# Patient Record
Sex: MALE | Race: WHITE | Employment: UNEMPLOYED | URBAN - METROPOLITAN AREA
[De-identification: names, ages, dates, MRNs, and addresses within clinical notes are randomized per-mention and may not be internally consistent; named-entity substitution may affect disease eponyms.]

---

## 2018-10-16 ENCOUNTER — HOSPITAL ENCOUNTER (OUTPATIENT)
Age: 48
Discharge: HOME | End: 2018-10-16

## 2018-10-16 LAB
INR BLD: 2.06 INDEX
PROTHROMBIN TIME: 23.7 SECONDS (ref 9.12–12.5)

## 2018-10-16 PROCEDURE — 36415 COLL VENOUS BLD VENIPUNCTURE: CPT

## 2018-10-16 PROCEDURE — 85610 PROTHROMBIN TIME: CPT

## 2019-01-06 ENCOUNTER — APPOINTMENT (OUTPATIENT)
Dept: CT IMAGING | Age: 49
End: 2019-01-06
Payer: COMMERCIAL

## 2019-01-06 ENCOUNTER — APPOINTMENT (OUTPATIENT)
Dept: ULTRASOUND IMAGING | Age: 49
End: 2019-01-06
Payer: COMMERCIAL

## 2019-01-06 ENCOUNTER — HOSPITAL ENCOUNTER (EMERGENCY)
Age: 49
Discharge: HOME HEALTH | End: 2019-01-06
Attending: EMERGENCY MEDICINE
Payer: COMMERCIAL

## 2019-01-06 VITALS
DIASTOLIC BLOOD PRESSURE: 75 MMHG | HEIGHT: 75 IN | OXYGEN SATURATION: 99 % | TEMPERATURE: 98.4 F | BODY MASS INDEX: 39.17 KG/M2 | RESPIRATION RATE: 15 BRPM | SYSTOLIC BLOOD PRESSURE: 112 MMHG | WEIGHT: 315 LBS | HEART RATE: 54 BPM

## 2019-01-06 DIAGNOSIS — N45.3 ORCHITIS AND EPIDIDYMITIS: ICD-10-CM

## 2019-01-06 DIAGNOSIS — N20.0 KIDNEY STONE: Primary | ICD-10-CM

## 2019-01-06 LAB
ALBUMIN SERPL-MCNC: 3.6 GM/DL (ref 3.4–5)
ALP BLD-CCNC: 81 IU/L (ref 40–129)
ALT SERPL-CCNC: 15 U/L (ref 10–40)
ANION GAP SERPL CALCULATED.3IONS-SCNC: 10 MMOL/L (ref 4–16)
AST SERPL-CCNC: 13 IU/L (ref 15–37)
BACTERIA: ABNORMAL /HPF
BASOPHILS ABSOLUTE: 0.1 K/CU MM
BASOPHILS RELATIVE PERCENT: 0.8 % (ref 0–1)
BILIRUB SERPL-MCNC: 0.4 MG/DL (ref 0–1)
BILIRUBIN URINE: NEGATIVE MG/DL
BLOOD, URINE: ABNORMAL
BUN BLDV-MCNC: 27 MG/DL (ref 6–23)
CALCIUM SERPL-MCNC: 8.6 MG/DL (ref 8.3–10.6)
CHLORIDE BLD-SCNC: 105 MMOL/L (ref 99–110)
CLARITY: CLEAR
CO2: 19 MMOL/L (ref 21–32)
COLOR: YELLOW
CREAT SERPL-MCNC: 1.6 MG/DL (ref 0.9–1.3)
DIFFERENTIAL TYPE: ABNORMAL
EOSINOPHILS ABSOLUTE: 0.1 K/CU MM
EOSINOPHILS RELATIVE PERCENT: 0.7 % (ref 0–3)
GFR AFRICAN AMERICAN: 56 ML/MIN/1.73M2
GFR NON-AFRICAN AMERICAN: 46 ML/MIN/1.73M2
GLUCOSE BLD-MCNC: 102 MG/DL (ref 70–99)
GLUCOSE, URINE: NEGATIVE MG/DL
HCT VFR BLD CALC: 57 % (ref 42–52)
HEMOGLOBIN: 16.4 GM/DL (ref 13.5–18)
IMMATURE NEUTROPHIL %: 0.8 % (ref 0–0.43)
KETONES, URINE: NEGATIVE MG/DL
LEUKOCYTE ESTERASE, URINE: NEGATIVE
LIPASE: 51 IU/L (ref 13–60)
LYMPHOCYTES ABSOLUTE: 1.5 K/CU MM
LYMPHOCYTES RELATIVE PERCENT: 16.3 % (ref 24–44)
MCH RBC QN AUTO: 28.7 PG (ref 27–31)
MCHC RBC AUTO-ENTMCNC: 28.8 % (ref 32–36)
MCV RBC AUTO: 99.8 FL (ref 78–100)
MONOCYTES ABSOLUTE: 0.7 K/CU MM
MONOCYTES RELATIVE PERCENT: 7.1 % (ref 0–4)
MUCUS: ABNORMAL HPF
NITRITE URINE, QUANTITATIVE: NEGATIVE
NUCLEATED RBC %: 0 %
PDW BLD-RTO: 14.3 % (ref 11.7–14.9)
PH, URINE: 5 (ref 5–8)
PLATELET # BLD: 241 K/CU MM (ref 140–440)
PMV BLD AUTO: 8.6 FL (ref 7.5–11.1)
POTASSIUM SERPL-SCNC: 5.1 MMOL/L (ref 3.5–5.1)
PROTEIN UA: NEGATIVE MG/DL
RBC # BLD: 5.71 M/CU MM (ref 4.6–6.2)
RBC URINE: 160 /HPF (ref 0–3)
REASON FOR REJECTION: NORMAL
REJECTED TEST: NORMAL
SEGMENTED NEUTROPHILS ABSOLUTE COUNT: 7 K/CU MM
SEGMENTED NEUTROPHILS RELATIVE PERCENT: 74.3 % (ref 36–66)
SODIUM BLD-SCNC: 134 MMOL/L (ref 135–145)
SOURCE: NORMAL
SPECIFIC GRAVITY UA: 1.03 (ref 1–1.03)
SQUAMOUS EPITHELIAL: 1 /HPF
TOTAL IMMATURE NEUTOROPHIL: 0.08 K/CU MM
TOTAL NUCLEATED RBC: 0 K/CU MM
TOTAL PROTEIN: 6.9 GM/DL (ref 6.4–8.2)
TRANSITIONAL EPITHELIAL: <1 /HPF
TRICHOMONAS: ABNORMAL /HPF
UROBILINOGEN, URINE: 1 MG/DL (ref 0.2–1)
WBC # BLD: 9.4 K/CU MM (ref 4–10.5)
WBC UA: 1 /HPF (ref 0–2)

## 2019-01-06 PROCEDURE — 96361 HYDRATE IV INFUSION ADD-ON: CPT

## 2019-01-06 PROCEDURE — 80053 COMPREHEN METABOLIC PANEL: CPT

## 2019-01-06 PROCEDURE — 96365 THER/PROPH/DIAG IV INF INIT: CPT

## 2019-01-06 PROCEDURE — 6360000002 HC RX W HCPCS: Performed by: EMERGENCY MEDICINE

## 2019-01-06 PROCEDURE — 83690 ASSAY OF LIPASE: CPT

## 2019-01-06 PROCEDURE — 87086 URINE CULTURE/COLONY COUNT: CPT

## 2019-01-06 PROCEDURE — 6360000002 HC RX W HCPCS: Performed by: PHYSICIAN ASSISTANT

## 2019-01-06 PROCEDURE — 93975 VASCULAR STUDY: CPT

## 2019-01-06 PROCEDURE — 76870 US EXAM SCROTUM: CPT

## 2019-01-06 PROCEDURE — 2580000003 HC RX 258: Performed by: EMERGENCY MEDICINE

## 2019-01-06 PROCEDURE — 96376 TX/PRO/DX INJ SAME DRUG ADON: CPT

## 2019-01-06 PROCEDURE — 96375 TX/PRO/DX INJ NEW DRUG ADDON: CPT

## 2019-01-06 PROCEDURE — 6370000000 HC RX 637 (ALT 250 FOR IP): Performed by: EMERGENCY MEDICINE

## 2019-01-06 PROCEDURE — 81001 URINALYSIS AUTO W/SCOPE: CPT

## 2019-01-06 PROCEDURE — 85025 COMPLETE CBC W/AUTO DIFF WBC: CPT

## 2019-01-06 PROCEDURE — 74176 CT ABD & PELVIS W/O CONTRAST: CPT

## 2019-01-06 PROCEDURE — 99284 EMERGENCY DEPT VISIT MOD MDM: CPT

## 2019-01-06 RX ORDER — KETOROLAC TROMETHAMINE 30 MG/ML
15 INJECTION, SOLUTION INTRAMUSCULAR; INTRAVENOUS ONCE
Status: COMPLETED | OUTPATIENT
Start: 2019-01-06 | End: 2019-01-06

## 2019-01-06 RX ORDER — 0.9 % SODIUM CHLORIDE 0.9 %
1000 INTRAVENOUS SOLUTION INTRAVENOUS ONCE
Status: COMPLETED | OUTPATIENT
Start: 2019-01-06 | End: 2019-01-06

## 2019-01-06 RX ORDER — MORPHINE SULFATE 4 MG/ML
4 INJECTION, SOLUTION INTRAMUSCULAR; INTRAVENOUS EVERY 30 MIN PRN
Status: DISCONTINUED | OUTPATIENT
Start: 2019-01-06 | End: 2019-01-06 | Stop reason: HOSPADM

## 2019-01-06 RX ORDER — TAMSULOSIN HYDROCHLORIDE 0.4 MG/1
0.4 CAPSULE ORAL ONCE
Status: COMPLETED | OUTPATIENT
Start: 2019-01-06 | End: 2019-01-06

## 2019-01-06 RX ORDER — ONDANSETRON 4 MG/1
4 TABLET, ORALLY DISINTEGRATING ORAL EVERY 8 HOURS PRN
Qty: 15 TABLET | Refills: 0 | Status: SHIPPED | OUTPATIENT
Start: 2019-01-06 | End: 2019-06-10

## 2019-01-06 RX ORDER — OXYCODONE HYDROCHLORIDE AND ACETAMINOPHEN 5; 325 MG/1; MG/1
1 TABLET ORAL EVERY 4 HOURS PRN
Qty: 15 TABLET | Refills: 0 | Status: SHIPPED | OUTPATIENT
Start: 2019-01-06 | End: 2019-01-09

## 2019-01-06 RX ORDER — TAMSULOSIN HYDROCHLORIDE 0.4 MG/1
0.4 CAPSULE ORAL DAILY
Qty: 10 CAPSULE | Refills: 3 | Status: SHIPPED | OUTPATIENT
Start: 2019-01-06 | End: 2019-06-10 | Stop reason: ALTCHOICE

## 2019-01-06 RX ORDER — LEVOFLOXACIN 500 MG/1
500 TABLET, FILM COATED ORAL DAILY
Qty: 10 TABLET | Refills: 0 | Status: SHIPPED | OUTPATIENT
Start: 2019-01-06 | End: 2019-01-16

## 2019-01-06 RX ORDER — LEVOFLOXACIN 500 MG/1
500 TABLET, FILM COATED ORAL ONCE
Status: COMPLETED | OUTPATIENT
Start: 2019-01-06 | End: 2019-01-06

## 2019-01-06 RX ORDER — ONDANSETRON 2 MG/ML
4 INJECTION INTRAMUSCULAR; INTRAVENOUS EVERY 30 MIN PRN
Status: DISCONTINUED | OUTPATIENT
Start: 2019-01-06 | End: 2019-01-06 | Stop reason: HOSPADM

## 2019-01-06 RX ADMIN — KETOROLAC TROMETHAMINE 15 MG: 30 INJECTION, SOLUTION INTRAMUSCULAR; INTRAVENOUS at 07:39

## 2019-01-06 RX ADMIN — MORPHINE SULFATE 4 MG: 4 INJECTION INTRAVENOUS at 09:09

## 2019-01-06 RX ADMIN — MORPHINE SULFATE 4 MG: 4 INJECTION INTRAVENOUS at 11:05

## 2019-01-06 RX ADMIN — ONDANSETRON 4 MG: 2 INJECTION INTRAMUSCULAR; INTRAVENOUS at 07:40

## 2019-01-06 RX ADMIN — SODIUM CHLORIDE 1000 ML: 9 INJECTION, SOLUTION INTRAVENOUS at 10:53

## 2019-01-06 RX ADMIN — LEVOFLOXACIN 500 MG: 500 TABLET, FILM COATED ORAL at 10:53

## 2019-01-06 RX ADMIN — MORPHINE SULFATE 4 MG: 4 INJECTION INTRAVENOUS at 07:39

## 2019-01-06 RX ADMIN — TAMSULOSIN HYDROCHLORIDE 0.4 MG: 0.4 CAPSULE ORAL at 10:53

## 2019-01-06 RX ADMIN — LIDOCAINE HYDROCHLORIDE 150 MG: 20 INJECTION, SOLUTION INTRAVENOUS at 10:26

## 2019-01-06 ASSESSMENT — ENCOUNTER SYMPTOMS
CONSTIPATION: 0
BACK PAIN: 0
SORE THROAT: 0
ABDOMINAL PAIN: 1
COUGH: 0
EYE REDNESS: 0
VOMITING: 0
SHORTNESS OF BREATH: 0
DIARRHEA: 0
RHINORRHEA: 0
NAUSEA: 1

## 2019-01-06 ASSESSMENT — PAIN SCALES - GENERAL
PAINLEVEL_OUTOF10: 4
PAINLEVEL_OUTOF10: 10
PAINLEVEL_OUTOF10: 5
PAINLEVEL_OUTOF10: 5
PAINLEVEL_OUTOF10: 10

## 2019-01-06 ASSESSMENT — PAIN DESCRIPTION - DESCRIPTORS
DESCRIPTORS: STABBING;ACHING
DESCRIPTORS: CONSTANT;SHARP;SHOOTING;STABBING

## 2019-01-06 ASSESSMENT — PAIN DESCRIPTION - PAIN TYPE
TYPE: ACUTE PAIN
TYPE: ACUTE PAIN

## 2019-01-06 ASSESSMENT — PAIN DESCRIPTION - ORIENTATION
ORIENTATION: LEFT
ORIENTATION: LEFT

## 2019-01-06 ASSESSMENT — PAIN DESCRIPTION - FREQUENCY
FREQUENCY: CONTINUOUS
FREQUENCY: CONTINUOUS

## 2019-01-06 ASSESSMENT — PAIN DESCRIPTION - LOCATION
LOCATION: FLANK
LOCATION: FLANK

## 2019-01-07 LAB
CULTURE: NORMAL
Lab: NORMAL
REPORT STATUS: NORMAL
SPECIMEN: NORMAL

## 2019-01-10 ENCOUNTER — HOSPITAL ENCOUNTER (OUTPATIENT)
Age: 49
Discharge: HOME | End: 2019-01-10
Payer: COMMERCIAL

## 2019-01-10 LAB
INR BLD: 1.92 INDEX
PROTHROMBIN TIME: 21.8 SECONDS (ref 9.12–12.5)

## 2019-01-10 PROCEDURE — 85610 PROTHROMBIN TIME: CPT

## 2019-01-10 PROCEDURE — 36415 COLL VENOUS BLD VENIPUNCTURE: CPT

## 2019-01-17 ENCOUNTER — HOSPITAL ENCOUNTER (OUTPATIENT)
Age: 49
Discharge: HOME | End: 2019-01-17
Payer: COMMERCIAL

## 2019-01-17 LAB
INR BLD: 2.11 INDEX
PROTHROMBIN TIME: 24.3 SECONDS (ref 9.12–12.5)

## 2019-01-17 PROCEDURE — 85610 PROTHROMBIN TIME: CPT

## 2019-01-17 PROCEDURE — 36415 COLL VENOUS BLD VENIPUNCTURE: CPT

## 2019-03-23 ENCOUNTER — HOSPITAL ENCOUNTER (OUTPATIENT)
Age: 49
Discharge: HOME | End: 2019-03-23
Payer: COMMERCIAL

## 2019-03-23 LAB
INR BLD: 2.39 INDEX
PROTHROMBIN TIME: 27.5 SECONDS (ref 9.12–12.5)

## 2019-03-23 PROCEDURE — 85610 PROTHROMBIN TIME: CPT

## 2019-03-23 PROCEDURE — 36415 COLL VENOUS BLD VENIPUNCTURE: CPT

## 2019-04-04 ENCOUNTER — TELEPHONE (OUTPATIENT)
Dept: GASTROENTEROLOGY | Age: 49
End: 2019-04-04

## 2019-04-04 NOTE — TELEPHONE ENCOUNTER
Please ask him to contact his PCP and get a referral first and then let me know.     Thanks    [employer]

## 2019-04-04 NOTE — TELEPHONE ENCOUNTER
Dr. Jaymie Davis, please see telephone message & advise if patient should have referral & if patient can see Effie Dance or wait until July/August for Dr. Jaymie Davis?

## 2020-08-26 ENCOUNTER — APPOINTMENT (OUTPATIENT)
Dept: GENERAL RADIOLOGY | Age: 50
DRG: 247 | End: 2020-08-26

## 2020-08-26 ENCOUNTER — HOSPITAL ENCOUNTER (INPATIENT)
Age: 50
LOS: 2 days | Discharge: HOME | DRG: 247 | End: 2020-08-28
Attending: EMERGENCY MEDICINE | Admitting: INTERNAL MEDICINE

## 2020-08-26 LAB
ALBUMIN SERPL-MCNC: 4 GM/DL (ref 3.4–5)
ALP BLD-CCNC: 85 IU/L (ref 40–129)
ALT SERPL-CCNC: 21 U/L (ref 10–40)
ANION GAP SERPL CALCULATED.3IONS-SCNC: 8 MMOL/L (ref 4–16)
APTT: 28.9 SECONDS (ref 25.1–37.1)
AST SERPL-CCNC: 15 IU/L (ref 15–37)
BASOPHILS ABSOLUTE: 0.1 K/CU MM
BASOPHILS RELATIVE PERCENT: 0.5 % (ref 0–1)
BILIRUB SERPL-MCNC: 0.6 MG/DL (ref 0–1)
BUN BLDV-MCNC: 23 MG/DL (ref 6–23)
CALCIUM SERPL-MCNC: 9.4 MG/DL (ref 8.3–10.6)
CHLORIDE BLD-SCNC: 105 MMOL/L (ref 99–110)
CO2: 25 MMOL/L (ref 21–32)
CREAT SERPL-MCNC: 1.3 MG/DL (ref 0.9–1.3)
DIFFERENTIAL TYPE: ABNORMAL
EOSINOPHILS ABSOLUTE: 0.2 K/CU MM
EOSINOPHILS RELATIVE PERCENT: 1.9 % (ref 0–3)
GFR AFRICAN AMERICAN: >60 ML/MIN/1.73M2
GFR NON-AFRICAN AMERICAN: 58 ML/MIN/1.73M2
GLUCOSE BLD-MCNC: 101 MG/DL (ref 70–99)
HCT VFR BLD CALC: 56.1 % (ref 42–52)
HEMOGLOBIN: 17.8 GM/DL (ref 13.5–18)
IMMATURE NEUTROPHIL %: 0.3 % (ref 0–0.43)
INR BLD: 1.02 INDEX
LYMPHOCYTES ABSOLUTE: 2.5 K/CU MM
LYMPHOCYTES RELATIVE PERCENT: 26.2 % (ref 24–44)
MCH RBC QN AUTO: 29.7 PG (ref 27–31)
MCHC RBC AUTO-ENTMCNC: 31.7 % (ref 32–36)
MCV RBC AUTO: 93.5 FL (ref 78–100)
MONOCYTES ABSOLUTE: 0.6 K/CU MM
MONOCYTES RELATIVE PERCENT: 6.3 % (ref 0–4)
NUCLEATED RBC %: 0 %
PDW BLD-RTO: 13.2 % (ref 11.7–14.9)
PLATELET # BLD: 252 K/CU MM (ref 140–440)
PMV BLD AUTO: 8.4 FL (ref 7.5–11.1)
POTASSIUM SERPL-SCNC: 5 MMOL/L (ref 3.5–5.1)
PROTHROMBIN TIME: 12.3 SECONDS (ref 11.7–14.5)
RBC # BLD: 6 M/CU MM (ref 4.6–6.2)
SEGMENTED NEUTROPHILS ABSOLUTE COUNT: 6.1 K/CU MM
SEGMENTED NEUTROPHILS RELATIVE PERCENT: 64.8 % (ref 36–66)
SODIUM BLD-SCNC: 138 MMOL/L (ref 135–145)
TOTAL IMMATURE NEUTOROPHIL: 0.03 K/CU MM
TOTAL NUCLEATED RBC: 0 K/CU MM
TOTAL PROTEIN: 6.8 GM/DL (ref 6.4–8.2)
TOTAL RETICULOCYTE COUNT: 0.09 K/CU MM
TROPONIN T: <0.01 NG/ML
WBC # BLD: 9.5 K/CU MM (ref 4–10.5)

## 2020-08-26 PROCEDURE — 6370000000 HC RX 637 (ALT 250 FOR IP): Performed by: EMERGENCY MEDICINE

## 2020-08-26 PROCEDURE — 84484 ASSAY OF TROPONIN QUANT: CPT

## 2020-08-26 PROCEDURE — 71045 X-RAY EXAM CHEST 1 VIEW: CPT

## 2020-08-26 PROCEDURE — 85025 COMPLETE CBC W/AUTO DIFF WBC: CPT

## 2020-08-26 PROCEDURE — 80048 BASIC METABOLIC PNL TOTAL CA: CPT

## 2020-08-26 PROCEDURE — 1200000000 HC SEMI PRIVATE

## 2020-08-26 PROCEDURE — 85610 PROTHROMBIN TIME: CPT

## 2020-08-26 PROCEDURE — 36415 COLL VENOUS BLD VENIPUNCTURE: CPT

## 2020-08-26 PROCEDURE — 85730 THROMBOPLASTIN TIME PARTIAL: CPT

## 2020-08-26 PROCEDURE — 2580000003 HC RX 258: Performed by: INTERNAL MEDICINE

## 2020-08-26 PROCEDURE — 80053 COMPREHEN METABOLIC PANEL: CPT

## 2020-08-26 PROCEDURE — 93005 ELECTROCARDIOGRAM TRACING: CPT | Performed by: EMERGENCY MEDICINE

## 2020-08-26 PROCEDURE — 99285 EMERGENCY DEPT VISIT HI MDM: CPT

## 2020-08-26 PROCEDURE — 6360000002 HC RX W HCPCS: Performed by: EMERGENCY MEDICINE

## 2020-08-26 RX ORDER — MORPHINE SULFATE 4 MG/ML
4 INJECTION, SOLUTION INTRAMUSCULAR; INTRAVENOUS ONCE
Status: COMPLETED | OUTPATIENT
Start: 2020-08-26 | End: 2020-08-26

## 2020-08-26 RX ORDER — ONDANSETRON 2 MG/ML
4 INJECTION INTRAMUSCULAR; INTRAVENOUS ONCE
Status: COMPLETED | OUTPATIENT
Start: 2020-08-26 | End: 2020-08-26

## 2020-08-26 RX ORDER — ASPIRIN 81 MG/1
324 TABLET, CHEWABLE ORAL ONCE
Status: COMPLETED | OUTPATIENT
Start: 2020-08-26 | End: 2020-08-26

## 2020-08-26 RX ORDER — SODIUM CHLORIDE, SODIUM LACTATE, POTASSIUM CHLORIDE, CALCIUM CHLORIDE 600; 310; 30; 20 MG/100ML; MG/100ML; MG/100ML; MG/100ML
INJECTION, SOLUTION INTRAVENOUS CONTINUOUS
Status: DISCONTINUED | OUTPATIENT
Start: 2020-08-26 | End: 2020-08-27

## 2020-08-26 RX ADMIN — SODIUM CHLORIDE, POTASSIUM CHLORIDE, SODIUM LACTATE AND CALCIUM CHLORIDE: 600; 310; 30; 20 INJECTION, SOLUTION INTRAVENOUS at 22:22

## 2020-08-26 RX ADMIN — ONDANSETRON 4 MG: 2 INJECTION INTRAMUSCULAR; INTRAVENOUS at 21:59

## 2020-08-26 RX ADMIN — MORPHINE SULFATE 4 MG: 4 INJECTION, SOLUTION INTRAMUSCULAR; INTRAVENOUS at 21:58

## 2020-08-26 RX ADMIN — ASPIRIN 81 MG CHEWABLE TABLET 324 MG: 81 TABLET CHEWABLE at 21:33

## 2020-08-26 ASSESSMENT — PAIN SCALES - GENERAL
PAINLEVEL_OUTOF10: 3
PAINLEVEL_OUTOF10: 3

## 2020-08-26 ASSESSMENT — PAIN DESCRIPTION - DESCRIPTORS: DESCRIPTORS: PRESSURE

## 2020-08-26 ASSESSMENT — PAIN DESCRIPTION - ORIENTATION: ORIENTATION: MID

## 2020-08-26 ASSESSMENT — PAIN DESCRIPTION - LOCATION: LOCATION: CHEST

## 2020-08-26 ASSESSMENT — PAIN DESCRIPTION - PAIN TYPE: TYPE: ACUTE PAIN

## 2020-08-26 NOTE — ED PROVIDER NOTES
As physician-in-triage, I performed a medical screening history and physical exam on this patient. HISTORY OF PRESENT ILLNESS  Opal Salazar is a 44 y.o. male chest pressure 1-2 weeks. Denies additional symptoms. Had negative stress test in July. Previous mi history. PHYSICAL EXAM  /71   Pulse 65   Temp 98.5 °F (36.9 °C) (Oral)   Resp 18   Ht 6' 3\" (1.905 m)   Wt (!) 304 lb (137.9 kg)   SpO2 96%   BMI 38.00 kg/m²     On exam, the patient appears in no acute distress. Speech is clear. Breathing is unlabored. Moves all extremities    Comment: Please note this report has been produced using speech recognition software and may contain errors related to that system including errors in grammar, punctuation, and spelling, as well as words and phrases that may be inappropriate. If there are any questions or concerns please feel free to contact the dictating provider for clarification.      Willa German DO  08/26/20 [id number]

## 2020-08-27 ENCOUNTER — APPOINTMENT (OUTPATIENT)
Dept: CT IMAGING | Age: 50
DRG: 247 | End: 2020-08-27

## 2020-08-27 LAB
ACTIVATED CLOTTING TIME, LOW RANGE: 290 SEC
ACTIVATED CLOTTING TIME, LOW RANGE: >400 SEC
EKG ATRIAL RATE: 67 BPM
EKG DIAGNOSIS: NORMAL
EKG P AXIS: 51 DEGREES
EKG P-R INTERVAL: 160 MS
EKG Q-T INTERVAL: 396 MS
EKG QRS DURATION: 96 MS
EKG QTC CALCULATION (BAZETT): 418 MS
EKG R AXIS: -35 DEGREES
EKG T AXIS: 34 DEGREES
EKG VENTRICULAR RATE: 67 BPM
HCT VFR BLD CALC: 52.7 % (ref 42–52)
HEMOGLOBIN: 16.3 GM/DL (ref 13.5–18)
LV EF: 55 %
LVEF MODALITY: NORMAL
MCH RBC QN AUTO: 29.6 PG (ref 27–31)
MCHC RBC AUTO-ENTMCNC: 30.9 % (ref 32–36)
MCV RBC AUTO: 95.6 FL (ref 78–100)
PDW BLD-RTO: 13.2 % (ref 11.7–14.9)
PLATELET # BLD: 214 K/CU MM (ref 140–440)
PMV BLD AUTO: 8.4 FL (ref 7.5–11.1)
RBC # BLD: 5.51 M/CU MM (ref 4.6–6.2)
TROPONIN T: <0.01 NG/ML
TROPONIN T: <0.01 NG/ML
WBC # BLD: 8.8 K/CU MM (ref 4–10.5)

## 2020-08-27 PROCEDURE — 6360000004 HC RX CONTRAST MEDICATION

## 2020-08-27 PROCEDURE — C1769 GUIDE WIRE: HCPCS

## 2020-08-27 PROCEDURE — 36415 COLL VENOUS BLD VENIPUNCTURE: CPT

## 2020-08-27 PROCEDURE — 6370000000 HC RX 637 (ALT 250 FOR IP): Performed by: INTERNAL MEDICINE

## 2020-08-27 PROCEDURE — 2140000000 HC CCU INTERMEDIATE R&B

## 2020-08-27 PROCEDURE — B2111ZZ FLUOROSCOPY OF MULTIPLE CORONARY ARTERIES USING LOW OSMOLAR CONTRAST: ICD-10-PCS | Performed by: INTERNAL MEDICINE

## 2020-08-27 PROCEDURE — 2709999900 HC NON-CHARGEABLE SUPPLY

## 2020-08-27 PROCEDURE — 6360000002 HC RX W HCPCS

## 2020-08-27 PROCEDURE — 92928 PRQ TCAT PLMT NTRAC ST 1 LES: CPT

## 2020-08-27 PROCEDURE — C1874 STENT, COATED/COV W/DEL SYS: HCPCS

## 2020-08-27 PROCEDURE — 85347 COAGULATION TIME ACTIVATED: CPT

## 2020-08-27 PROCEDURE — 71275 CT ANGIOGRAPHY CHEST: CPT

## 2020-08-27 PROCEDURE — 027135Z DILATION OF CORONARY ARTERY, TWO ARTERIES WITH TWO DRUG-ELUTING INTRALUMINAL DEVICES, PERCUTANEOUS APPROACH: ICD-10-PCS | Performed by: INTERNAL MEDICINE

## 2020-08-27 PROCEDURE — 93010 ELECTROCARDIOGRAM REPORT: CPT | Performed by: INTERNAL MEDICINE

## 2020-08-27 PROCEDURE — 2500000003 HC RX 250 WO HCPCS

## 2020-08-27 PROCEDURE — 2580000003 HC RX 258: Performed by: INTERNAL MEDICINE

## 2020-08-27 PROCEDURE — 93306 TTE W/DOPPLER COMPLETE: CPT

## 2020-08-27 PROCEDURE — 4A023N7 MEASUREMENT OF CARDIAC SAMPLING AND PRESSURE, LEFT HEART, PERCUTANEOUS APPROACH: ICD-10-PCS | Performed by: INTERNAL MEDICINE

## 2020-08-27 PROCEDURE — 85027 COMPLETE CBC AUTOMATED: CPT

## 2020-08-27 PROCEDURE — 84484 ASSAY OF TROPONIN QUANT: CPT

## 2020-08-27 PROCEDURE — 6360000004 HC RX CONTRAST MEDICATION: Performed by: INTERNAL MEDICINE

## 2020-08-27 PROCEDURE — 6370000000 HC RX 637 (ALT 250 FOR IP)

## 2020-08-27 PROCEDURE — C1887 CATHETER, GUIDING: HCPCS

## 2020-08-27 PROCEDURE — 93458 L HRT ARTERY/VENTRICLE ANGIO: CPT

## 2020-08-27 PROCEDURE — 94761 N-INVAS EAR/PLS OXIMETRY MLT: CPT

## 2020-08-27 PROCEDURE — C1894 INTRO/SHEATH, NON-LASER: HCPCS

## 2020-08-27 RX ORDER — LOSARTAN POTASSIUM 100 MG/1
100 TABLET ORAL DAILY
Status: DISCONTINUED | OUTPATIENT
Start: 2020-08-27 | End: 2020-08-28 | Stop reason: HOSPADM

## 2020-08-27 RX ORDER — ASPIRIN 81 MG/1
81 TABLET, CHEWABLE ORAL DAILY
Status: DISCONTINUED | OUTPATIENT
Start: 2020-08-28 | End: 2020-08-28 | Stop reason: HOSPADM

## 2020-08-27 RX ORDER — SODIUM CHLORIDE 9 MG/ML
INJECTION, SOLUTION INTRAVENOUS CONTINUOUS
Status: DISCONTINUED | OUTPATIENT
Start: 2020-08-27 | End: 2020-08-28

## 2020-08-27 RX ORDER — ACETAMINOPHEN 325 MG/1
650 TABLET ORAL EVERY 4 HOURS PRN
Status: DISCONTINUED | OUTPATIENT
Start: 2020-08-27 | End: 2020-08-28 | Stop reason: HOSPADM

## 2020-08-27 RX ORDER — METOPROLOL SUCCINATE 25 MG/1
25 TABLET, EXTENDED RELEASE ORAL DAILY
Status: DISCONTINUED | OUTPATIENT
Start: 2020-08-27 | End: 2020-08-28 | Stop reason: HOSPADM

## 2020-08-27 RX ORDER — SODIUM CHLORIDE 0.9 % (FLUSH) 0.9 %
10 SYRINGE (ML) INJECTION EVERY 12 HOURS SCHEDULED
Status: DISCONTINUED | OUTPATIENT
Start: 2020-08-27 | End: 2020-08-28 | Stop reason: HOSPADM

## 2020-08-27 RX ORDER — ASPIRIN 325 MG
325 TABLET ORAL DAILY
Status: DISCONTINUED | OUTPATIENT
Start: 2020-08-27 | End: 2020-08-27

## 2020-08-27 RX ORDER — SODIUM CHLORIDE 9 MG/ML
INJECTION, SOLUTION INTRAVENOUS CONTINUOUS
Status: DISCONTINUED | OUTPATIENT
Start: 2020-08-27 | End: 2020-08-27 | Stop reason: SDUPTHER

## 2020-08-27 RX ORDER — OXYCODONE HYDROCHLORIDE AND ACETAMINOPHEN 5; 325 MG/1; MG/1
1 TABLET ORAL EVERY 4 HOURS PRN
Status: DISCONTINUED | OUTPATIENT
Start: 2020-08-27 | End: 2020-08-28 | Stop reason: HOSPADM

## 2020-08-27 RX ORDER — ACETAMINOPHEN 325 MG/1
650 TABLET ORAL EVERY 6 HOURS PRN
Status: DISCONTINUED | OUTPATIENT
Start: 2020-08-27 | End: 2020-08-27 | Stop reason: SDUPTHER

## 2020-08-27 RX ORDER — MORPHINE SULFATE 2 MG/ML
1 INJECTION, SOLUTION INTRAMUSCULAR; INTRAVENOUS
Status: ACTIVE | OUTPATIENT
Start: 2020-08-27 | End: 2020-08-27

## 2020-08-27 RX ORDER — ONDANSETRON 2 MG/ML
4 INJECTION INTRAMUSCULAR; INTRAVENOUS EVERY 6 HOURS PRN
Status: DISCONTINUED | OUTPATIENT
Start: 2020-08-27 | End: 2020-08-28 | Stop reason: HOSPADM

## 2020-08-27 RX ORDER — POLYETHYLENE GLYCOL 3350 17 G/17G
17 POWDER, FOR SOLUTION ORAL DAILY PRN
Status: DISCONTINUED | OUTPATIENT
Start: 2020-08-27 | End: 2020-08-28 | Stop reason: HOSPADM

## 2020-08-27 RX ORDER — CLOPIDOGREL BISULFATE 75 MG/1
75 TABLET ORAL DAILY
Status: DISCONTINUED | OUTPATIENT
Start: 2020-08-28 | End: 2020-08-28 | Stop reason: HOSPADM

## 2020-08-27 RX ORDER — SODIUM CHLORIDE 0.9 % (FLUSH) 0.9 %
10 SYRINGE (ML) INJECTION PRN
Status: DISCONTINUED | OUTPATIENT
Start: 2020-08-27 | End: 2020-08-28 | Stop reason: HOSPADM

## 2020-08-27 RX ORDER — ATORVASTATIN CALCIUM 40 MG/1
40 TABLET, FILM COATED ORAL NIGHTLY
Status: DISCONTINUED | OUTPATIENT
Start: 2020-08-27 | End: 2020-08-28 | Stop reason: HOSPADM

## 2020-08-27 RX ORDER — ATROPINE SULFATE 0.4 MG/ML
0.5 AMPUL (ML) INJECTION
Status: ACTIVE | OUTPATIENT
Start: 2020-08-27 | End: 2020-08-27

## 2020-08-27 RX ORDER — ACETAMINOPHEN 650 MG/1
650 SUPPOSITORY RECTAL EVERY 6 HOURS PRN
Status: DISCONTINUED | OUTPATIENT
Start: 2020-08-27 | End: 2020-08-28 | Stop reason: HOSPADM

## 2020-08-27 RX ORDER — NICOTINE 21 MG/24HR
1 PATCH, TRANSDERMAL 24 HOURS TRANSDERMAL DAILY
Status: DISCONTINUED | OUTPATIENT
Start: 2020-08-27 | End: 2020-08-28 | Stop reason: HOSPADM

## 2020-08-27 RX ORDER — PROMETHAZINE HYDROCHLORIDE 25 MG/1
12.5 TABLET ORAL EVERY 6 HOURS PRN
Status: DISCONTINUED | OUTPATIENT
Start: 2020-08-27 | End: 2020-08-28 | Stop reason: HOSPADM

## 2020-08-27 RX ORDER — METOPROLOL SUCCINATE 50 MG/1
50 TABLET, EXTENDED RELEASE ORAL DAILY
Status: DISCONTINUED | OUTPATIENT
Start: 2020-08-27 | End: 2020-08-27

## 2020-08-27 RX ADMIN — ATORVASTATIN CALCIUM 40 MG: 40 TABLET, FILM COATED ORAL at 21:39

## 2020-08-27 RX ADMIN — SODIUM CHLORIDE, PRESERVATIVE FREE 10 ML: 5 INJECTION INTRAVENOUS at 09:59

## 2020-08-27 RX ADMIN — IOPAMIDOL 80 ML: 755 INJECTION, SOLUTION INTRAVENOUS at 09:58

## 2020-08-27 RX ADMIN — OXYCODONE HYDROCHLORIDE AND ACETAMINOPHEN 1 TABLET: 5; 325 TABLET ORAL at 12:38

## 2020-08-27 ASSESSMENT — PAIN SCALES - GENERAL
PAINLEVEL_OUTOF10: 0
PAINLEVEL_OUTOF10: 0
PAINLEVEL_OUTOF10: 5
PAINLEVEL_OUTOF10: 0

## 2020-08-27 ASSESSMENT — PAIN - FUNCTIONAL ASSESSMENT: PAIN_FUNCTIONAL_ASSESSMENT: 0-10

## 2020-08-27 NOTE — PROGRESS NOTES
Patient follows in office  He texted saying that he Is waiting in ER for 2.5 hours  Spoke with Estee-patient will be seen soon by er   Trop negative    Cath 2013-negative  Last stress test 5/2020- in office--EF 58%, large apical wall infarction , no ischemia   Will consider cath due to recent stress test

## 2020-08-27 NOTE — PLAN OF CARE
Problem: Falls - Risk of:  Goal: Will remain free from falls  Description: Will remain free from falls  8/27/2020 1105 by Jamaal Myrick RN  Outcome: Ongoing  8/27/2020 1104 by Jamaal Myrick RN  Outcome: Ongoing  Goal: Absence of physical injury  Description: Absence of physical injury  8/27/2020 1105 by Jamaal Myrick RN  Outcome: Ongoing  8/27/2020 1104 by Jamaal Myrick RN  Outcome: Ongoing     Problem: Infection:  Goal: Will remain free from infection  Description: Will remain free from infection  Outcome: Ongoing     Problem: Safety:  Goal: Free from accidental physical injury  Description: Free from accidental physical injury  Outcome: Ongoing  Goal: Free from intentional harm  Description: Free from intentional harm  Outcome: Ongoing     Problem: Daily Care:  Goal: Daily care needs are met  Description: Daily care needs are met  Outcome: Ongoing     Problem: Pain:  Goal: Patient's pain/discomfort is manageable  Description: Patient's pain/discomfort is manageable  Outcome: Ongoing     Problem: Skin Integrity:  Goal: Skin integrity will stabilize  Description: Skin integrity will stabilize  Outcome: Ongoing     Problem: Discharge Planning:  Goal: Patients continuum of care needs are met  Description: Patients continuum of care needs are met  Outcome: Ongoing

## 2020-08-27 NOTE — H&P
HISTORY AND PHYSICAL  (Hospitalist, Internal Medicine)  IDENTIFYING INFORMATION   PATIENT:  Collette Alarcon  MRN:  [id number]  ADMIT DATE: 8/26/2020  TIME OF EVALUATION: 8/26/2020 9:28 PM    CHIEF COMPLAINT     CP  HISTORY OF PRESENT ILLNESS   Collette Alarcon is a 44 y.o. male admitted for CP for about two weeks, \"maybe a little bit longer. \" Started a new truck drivering job, thought he was not adjusting to a new shift and the associated stress, but because it became persistent he came in to the ED. Had a stress test that was normal in July 2020. Also he \"had a thrombus in the heart, [however] was told to quit coumadin. \" He quit on Friday the 14th of August of this year. He states the CP is couple min to 10-15 min usually, but today the CP started at 4 pm, he was going to start work and it started. He didn't come sooner because he thought it was d/t stress, but felt like he had enough and came to the ED. Pt otherwise has no complaints of SOB, dizziness, N/V/C/D, abdominal pain, dysuria, joint pains, rash/boils, or fevers.        PMH listed below:    PAST MEDICAL, SURGICAL, FAMILY, and SOCIAL HISTORY     Past Medical History:   Diagnosis Date    CAD (coronary artery disease)     Chronic kidney disease     stones 20 yrs ago    [facility] [address]     Hyperlipidemia     Myocardial infarction ([facility] [address] 1995, 1996, 2009    MI x3     Past Surgical History:   Procedure Laterality Date    APPENDECTOMY      CARDIAC SURGERY      heart caths    CHOLECYSTECTOMY      CORONARY ANGIOPLASTY WITH STENT PLACEMENT      CYST INCISION AND DRAINAGE       Family History   Problem Relation Age of Onset    Heart Disease Mother     Arthritis Mother     High Blood Pressure Mother     Heart Disease Father     Diabetes Brother     High Blood Pressure Brother     Mental Illness Brother      Family Hx of HTN  Family Hx as reviewed above, otherwise non-contributory  Social History     Socioeconomic History  Marital status:      Spouse name: None    Number of children: None    Years of education: None    Highest education level: None   Occupational History    None   Social Needs    Financial resource strain: None    Food insecurity     Worry: None     Inability: None    Transportation needs     Medical: None     Non-medical: None   Tobacco Use    Smoking status: Current Every Day Smoker     Packs/day: 1.00     Years: 15.00     Pack years: 15.00     Types: Cigarettes    Smokeless tobacco: Former User   Substance and Sexual Activity    Alcohol use: No    Drug use: No    Sexual activity: Yes   Lifestyle    Physical activity     Days per week: None     Minutes per session: None    Stress: None   Relationships    Social connections     Talks on phone: None     Gets together: None     Attends [employer] service: None     Active member of club or organization: None     Attends meetings of clubs or organizations: None     Relationship status: None    Intimate partner violence     Fear of current or ex partner: None     Emotionally abused: None     Physically abused: None     Forced sexual activity: None   Other Topics Concern    None   Social History Narrative    None       MEDICATIONS   Medications Prior to Admission  Not in a hospital admission. Current Medications  Current Facility-Administered Medications   Medication Dose Route Frequency Provider Last Rate Last Dose    aspirin chewable tablet 324 mg  324 mg Oral Once Dee Dee Krishna MD         Current Outpatient Medications   Medication Sig Dispense Refill    losartan (COZAAR) 100 MG tablet       metoprolol succinate (TOPROL XL) 50 MG extended release tablet daily.  nitroGLYCERIN (NITROSTAT) 0.4 MG SL tablet Place 0.4 mg under the tongue      isosorbide mononitrate (IMDUR) 30 MG CR tablet Take 30 mg by mouth daily.  aspirin 325 MG tablet Take 325 mg by mouth daily.            Allergies  No Known Allergies    REVIEW OF SYSTEMS Within above limitations. 14 point review of systems reviewed. Pertinent positive or negative as per HPI or otherwise negative per 14 point systems review. PHYSICAL EXAM     Wt Readings from Last 3 Encounters:   08/26/20 (!) 304 lb (137.9 kg)   06/10/19 (!) 370 lb (167.8 kg)   01/06/19 (!) 375 lb (170.1 kg)       Blood pressure (!) 124/47, pulse 68, temperature 98.5 °F (36.9 °C), temperature source Oral, resp. rate 18, height 6' 3\" (1.905 m), weight (!) 304 lb (137.9 kg), SpO2 93 %. General - AAO x 3  Psych - Appropriate affect/speech. No agitation  Eyes - Eye lids intact. No scleral icterus  ENT - Lips wnl. External ear clear/dry/intact. No thyromegaly on inspection  Neuro - No gross peripheral or central neuro deficits on inspection  Heart - Sinus. RRR. S1 and S2 present. No added HS/murmurs appreciated. No elevated JVD appreciated  Lung - Adequate air entry b/l, No crackles/wheezes appreciated  GI - Soft. No guarding/rigidity. No hepatosplenomegaly/ascites. BS+   - No CVA/suprapubic tenderness or palpable bladder distension  Skin - Intact. No rash/petechiae/ecchymosis. Warm extremities  MSK - Joints with normal ROM.  No joint swellings    Lines/Drains/Airways/Wounds:  [unfilled]    LABS AND IMAGING   CBC  [unfilled]    Last 3 Hemoglobin  Lab Results   Component Value Date    HGB 17.8 08/26/2020    HGB 16.4 01/06/2019    HGB 15.9 10/15/2014     Last 3 WBC/ANC  Lab Results   Component Value Date    WBC 9.5 08/26/2020    WBC 9.4 01/06/2019    WBC 8.3 10/15/2014     No components found for: GRNLOCTYABS  Last 3 Platelets  No results found for: PLATELET  Chemistry  [unfilled]  [unfilled]  No results found for: LDH  Coagulation Studies  Lab Results   Component Value Date    INR 2.39 03/23/2019     Liver Function Studies  Lab Results   Component Value Date    ALT 21 08/26/2020    AST 15 08/26/2020    ALKPHOS 85 08/26/2020       Recent Imaging        Relevant labs and imaging reviewed    ASSESSMENT AND PLAN   Dominick Faulkner is a 44 y.o. male p/w    Chest pain, consider ACS.     Admit to Med/Surg    Telemetry monitoring    Serial troponin level and repeat EKG in AM   Cardiology consult, Dr. Addis Ospina, N   Antiplatelet/Statin   ECHO    HTN  - c/w lostartan    CKD?   - Cr at baseline  - avoid nephrotoxic medication  - IVF  - monitor I/Os  - consider nephrology consult        Case d/w ED provider    DVT ppx: lovenox  Code status: Full    [facility], Internal Medicine  8/26/2020 at 9:28 PM

## 2020-08-27 NOTE — OP NOTE
Operative Note      Patient: Kym Guadarrama  YOB: 1970  MRN: [id number]    Date of Procedure: 8/27/20    Pre-Op Diagnosis: unstable angina  Post-Op Diagnosis: Same    Estimated Blood Loss (mL): Minimal    Complications: None    Electronically signed by Nat Gibson MD on 8/27/2020 at 11:12 AM    DICTATED --[id number]  LEFT MAIN PATENT  LAD PROXIMAL  90% TO 0% KAREN XIENCE 3.5X18MM STENT  RAMUS MILD DX  LCX MILD DX  RCA MID 90% KAREN XIENCE 4.0X33MM STENT  LVEDP 25  ASA AND PALVIX AND HEPARIN   NO COMPLICATIONS  RIGHT RADIAL APPROACH

## 2020-08-27 NOTE — ED NOTES
Called for room placement with no answer. Security states noting him to walk out and get into vehicle.       Mango Meyers RN  08/26/20 2003

## 2020-08-27 NOTE — PROCEDURES
[address] [place] [address], [address] [place] [address]                            CARDIAC CATHETERIZATION    PATIENT NAME: Tammi Tatum                      :        1970  MED REC NO:   [id number]                          ROOM:       3517  ACCOUNT NO:   [de-identified]                           ADMIT DATE: 2020  PROVIDER:     Mateus Davis MD    DATE OF PROCEDURE:  2020    INDICATION:  Unstable angina. This is a 64-year-old male patient brought to the cath lab today. Informed consent was obtained from the patient. The patient was prepped  and draped in a sterile fashion. The patient was injected with 5 mL of  2% lidocaine in the right radial region. Using a radial needle, right  radial artery was canalized and a 5/6-Lithuanian sheath was placed in the  right radial artery. The entire procedure was done using guidewire. The sheath was flushed in between the procedures. Using a pigtail catheter, EDP was measured. EDP was between 20 mmHg to  25 mmHg present. On the pullback, there was no gradient present across  the aortic valve. Using a TIG catheter, right coronary angiogram was performed. Right  coronary angiogram revealed the right coronary artery has a mid 80% to  90% stenosis noted. It is a large sized vessel, gives off PD and PL  branch. There is mild disease noted in that. Using a TIG catheter, left coronary angiogram was performed. Left  coronary angiogram revealed the left main is long and is patent; it  trifurcates into the LAD, circ, and ramus branch. Circ is a small vessel. This has mild disease noted. Ramus has mild  disease noted. LAD is a medium-sized vessel proximally and has proximal  80% to 90% stenosis noted in the proximal segment. There is a stent  present following that _____ stent is patent. It gives off medium-sized  diagonal branch. There is mild disease noted in the diagonal branch. Rest of the LAD reaches and wraps the apex. LAD has mild disease noted. There is a small vessel distally. IMPRESSION:  1. EDP is around 20 to 25 mmHg present. 2.  Right coronary artery has mid 80% to 90% stenosis noted. 3.  Left main is patent. 4.  Circ is a small vessel and is patent. 5.  Ramus is a small to medium sized vessel and has mild disease noted. 6.  LAD has a proximal 90% stenosis noted, followed by mid stent which  is patent. Mid to distal LAD is a small vessel and gives off a small to  medium sized diagonal branch which is patent also. The plan is to proceed with intervention of the LAD and RCA. The patient already had a 6-Pashto sheath. The patient was anticoagulated with heparin. ACT was kept greater than  250. The patient received Plavix 600 mg postprocedure and aspirin 325  mg postprocedure. Then using a VL3.5 guide, left main was engaged. A BMW Elite wire was  placed into the diagonal branch. A Runthrough wire was placed in the  LAD. The LAD lesion was stented with a drug-eluting stent Xience 3.5 x  18 mm stent. The stent was deployed at high pressure to almost 4 mm. The lesion decreased from 80-90% to 0%. Final angiogram shows NANCY-3  flow noted. No dissection, perforation, or distal embolization noted. Diagonal branch is also preserved. Then using a JR4 guide, right coronary artery was engaged. A BMW Elite  wire was placed into right coronary artery. A GuideLiner was used. Right coronary artery mid lesion was stented with a drug-eluting stent  Xience 4.0 x 33 mm stent. The stent was deployed at high pressure to  almost 4.25 mm. The lesion decreased from 80-90% to 0%. Final  angiogram shows NANCY-3 flow noted. No dissection, perforation, or  distal embolization noted. IMPRESSION:  1. LVEDP was around 20 to 25 mmHg present. 2.  Left main is patent. 3.  LAD proximal has 90% stenosis.   Stented with a drug-eluting stent  Xience 3.5 x 18 mm stent.    Circ has mild disease noted and is a small vessel. Ramus has small vessel and mild disease noted. RCA has mid 80% to 90% stenosis. Stented with a drug-eluting stent  Xience 4.0 x 33 mm stent. The patient tolerated the procedure well, no  complications noted. Highly recommended the patient to be compliant  with medications.     Blood loss Omer Chow MD    D: 08/27/2020 11:16:54       T: 08/27/2020 12:09:27     [email or web address]  Job#: [id number]     Doc#: [id number]    CC:

## 2020-08-27 NOTE — ED PROVIDER NOTES
EMERGENCY DEPARTMENT ENCOUNTER      CHIEF COMPLAINT:   Chest pain    HPI: Toni Snyder is a 44 y.o. male who presents to the Emergency Department complaining of chest pain. The patient states that the pain started 2 weeks ago and has been intermittent. It been worse today. It feels like a heaviness and is located in the left chest. It radiates to the left shoulder. It is associated with shortness of breath. Denies nausea, vomiting, or diaphoresis. There are no exacerbating or relieving factors. The patient has a history of coronary artery disease with MI x 3 and stents in place. There is no known history of DVT, PE, or thoracic aortic dissection. He has some chronic bilateral leg pain, but no swelling. The patient denies fevers, chills, neck pain, shortness of breath, cough, hemoptysis, abdominal pain, nausea, vomiting, numbness, tingling, weakness, or any other complaints. REVIEW OF SYSTEMS:  CONSTITUTIONAL:  Denies fever, chills, weight loss or weakness  EYES:  Denies photophobia or discharge  ENT:  Denies sore throat or ear pain  CARDIOVASCULAR:  See HPI  RESPIRATORY:  Denies cough or shortness of breath  GI:  Denies abdominal pain, nausea, vomiting, or diarrhea  MUSCULOSKELETAL:  Denies back pain  SKIN:  No rash  NEUROLOGIC:  Denies headache, focal weakness or sensory changes  All systems negative except as marked. \"Remaining review of systems reviewed and negative. I have reviewed the nursing triage documentation and agree unless otherwise noted below. \"      PAST MEDICAL HISTORY:   Past Medical History:   Diagnosis Date    CAD (coronary artery disease)     Chronic kidney disease     stones 20 yrs ago    [facility] [address]     Hyperlipidemia     Myocardial infarction ([facility] [address] 1995, 1996, 2009    MI x3       CURRENT MEDICATIONS:   Home medications reviewed.     SURGICAL HISTORY:   Past Surgical History:   Procedure Laterality Date    APPENDECTOMY      CARDIAC SURGERY Group      /71      Pulse 65      Resp 18      Temp 98.5 °F (36.9 °C)      Temp Source Oral      SpO2 96 %      Weight (!) 304 lb (137.9 kg)      Height 6' 3\" (1.905 m)      Head Circumference       Peak Flow       Pain Score       Pain Loc       Pain Edu? Excl. in [address]? Constitutional:  Non-toxic appearance  HENT: Normocephalic, Atraumatic, Bilateral external ears normal, Oropharynx moist, No oral exudates, Nose normal.  Eyes:  PERRL, Conjunctiva normal, No discharge. Neck: Normal range of motion, No tenderness, Supple, No stridor, No lymphadenopathy  Cardiovascular:  Normal heart rate, Normal rhythm  Pulmonary/Chest:  Normal breath sounds, No respiratory distress, No wheezing, No chest tenderness  Abdomen: Bowel sounds normal, Soft, No tenderness, No masses, No pulsatile masses  Back:  No tenderness, No CVA tenderness  Extremities:  Normal range of motion, Intact distal pulses, No edema, No tenderness  Skin:  Warm, Dry, No erythema, No rash    EKG Interpretation  Interpreted by me  Compared to 10/15/14  Rhythm: normal sinus  Rate: normal 67  Axis: left  Ectopy: none  Conduction: normal  ST Segments: no acute change  T Waves: no acute change  Q Waves: none  Clinical Impression: normal sinus rhythm, LAD, no acute change    Cardiac Monitor Strip Interpretation  Interpreted by me  Monitor strip interpreted for greater than 10 seconds  Rhythm: normal sinus  Rate: normal  Ectopy: none  ST Segments: normal      Radiology / Procedures:     XR CHEST PORTABLE (Final result)   Result time 08/26/20 18:10:29   Final result by Shin Liu MD (08/26/20 18:10:29)                 Impression:     Radiographically clear lungs. Narrative:     EXAMINATION:   ONE XRAY VIEW OF THE CHEST     8/26/2020 5:54 pm     COMPARISON:   None.      HISTORY:   ORDERING SYSTEM PROVIDED HISTORY: chest pain   TECHNOLOGIST PROVIDED HISTORY:   Reason for exam:->chest pain   Reason for Exam: chest tightness for the past few with the hospitalist who will admit the patient for further treatment and care. The patient is currently in stable condition awaiting admission. Clinical Impression:  1. Chest pain, unspecified type        Comment: Please note this report has been produced using speech recognition software and may contain errors related to that system including errors in grammar, punctuation, and spelling, as well as words and phrases that may be inappropriate. If there are any questions or concerns please feel free to contact the dictating provider for clarification.         Landon Longoria MD  08/27/20 [id number]

## 2020-08-27 NOTE — CARE COORDINATION
.CM met with pt for d/c planning. Introduced self and updated white board. Pt lives with spouse and is independent with ADL's. Pt drives and works as a . Pt does not have a PCP or insurance. Pt uses a c-pap at . Pt denies any needs at this time. D/c plan is home with wife. Referral made to THOMAS/Leena Pantoja via confidential VM and to Kirstin/[facility].  PCP list and [facility] walk in clinic info provided. Notify CM if any other d/c needs arise.   TE

## 2020-08-27 NOTE — CONSULTS
[address] [place] [address], [address] [place] [address]                                  CONSULTATION    PATIENT NAME: Miguel Pierce                      :        1970  MED REC NO:   [id number]                          ROOM:       3110  ACCOUNT NO:   [de-identified]                           ADMIT DATE: 2020  PROVIDER:     DEO Sánchez Che    CONSULT DATE:  2020    CHIEF COMPLAINT:  Chest pain. HISTORY OF PRESENT ILLNESS:  A 57-year-old male with a past medical  history of CAD, status post PCI, last one in  range. The patient  used to see  _____ and Dr. Rene Hein. He has been following at the  office for some time. The patient is a  and he is starting  a new job recently. He recently had a workup including stress test and  echo outpatient. Stress test showed preserved EF with a large apical  MI. This is chronic and has been on past stress tests as well. No  active ischemia was noted. He does have a history of an LV thrombus  that was noted on his previous echo, for which he was taking warfarin. Most recent echo showed preserved EF with no LV thrombus noted. Discussed and elected to stop taking warfarin and replace it with  aspirin long term. The patient states that just a couple of weeks ago,  he started developing a mild exertional chest pain. It happened when he  was outside working in the heat for long periods of time and then  yesterday it got worse. Usually, it was about a 3/10 chest pain that  would resolve after a few minutes. Yesterday, it became a 7/10 chest  pain that lasted for 10 to 15 minutes. Chest pressure radiating to his  left arm. Similar as prior episodes where he needed stents. He denies  any shortness of breath, nausea, vomiting, diaphoresis. He is  comfortable at this time. PAST MEDICAL HISTORY:  CAD, CKD, COPD, hyperlipidemia, and MI, last one  in .     PAST SURGICAL HISTORY: Appendectomy, multiple PCIs, cholecystectomy,  incision and drainage. FAMILY HISTORY:  Noncontributory. SOCIAL HISTORY:  The patient is a current everyday smoker. He smokes a  pack a day. He has been doing this for about 15 years. He does not  drink. ALLERGIES:  No known drug allergies. HOME MEDICATIONS:  The patient was taking Lipitor 40 mg daily, aspirin  81 mg daily, losartan 100 mg daily, Toprol 50 mg daily. REVIEW OF SYSTEMS:  A 10-point review of systems is reviewed and is  negative unless noted in the HPI. PHYSICAL EXAMINATION:  GENERAL:  Awake and alert male, sitting up in bed, in no acute distress. HEAD:  Atraumatic and normocephalic. EYES:  No scleral erythema, discharge, or conjunctivitis noted. NECK:  Trachea is midline with no apparent masses. CARDIOVASCULAR:  Regular rate and rhythm with no murmurs, rubs, or  gallops auscultated. LUNGS:  Clear to auscultation bilaterally. Good rise and fall of the  chest.  ABDOMEN:  Soft and nontender. MUSCULOSKELETAL:  No obvious joint deformities. SKIN:  No erythema or ecchymosis noted. NEUROLOGIC:  Alert and oriented x4. PSYCH:  Normal mood and affect. RADIOLOGY:  Last stress test was done on 05/20/2020. It showed EF was  preserved at 58% with no ischemia, a large apical MI, and compared to  the study of 04/15/2019, no changes. Last echo was done on 05/21/2020  and it showed EF was 58% with impaired relaxation diastolic dysfunction,  mild-to-moderate AR, mild-to-moderate MR, mild AS with AV mean as 11  mmHg. Normal LVEDP and no pericardial effusion. RVSP was 33 mmHg,  which was within the normal range. Last left heart cath was done on  07/11/2013, EF was 45% at that time, 0% in the proximal LAD, 30% in the  ostial mid LAD, unchanged from previous. V1 and V2 were both patent, RI  was patent, circ was patent, OM1 was patent, RCA was patent. Nonobstructive CAD with patent stents. Medical management was  recommended.   This was done by Dr. Janessa Nelson. Chest x-ray was done that  shows no acute process. CT chest has been ordered, we will review this. Echo has been ordered, we will review this. EKG was done that shows  moderate criteria for LVH and normal sinus rhythm, otherwise no  significant ST changes. LABORATORY DATA:  Troponin has been negative x2. BMP is drawn, his  creatinine is stable at 1.3. LFTs are within normal limits. CBC is  drawn, hemoglobin is stable at 16.3. INR is 1.02. IMPRESSION AND PLAN:  A 49-year-old male with a past medical history of  CAD with recent cardiac work outpatient that was stable from previous. He now comes into the hospital with having exertional chest pain. Due  to recent negative workup, but typical sounding chest pain, I believe  that he needs a left heart catheterization done. We will plan for heart  cath today and we will review echo. Further treatment will be dictated  by hospital course.       Patient seen and chart reviewed  Agree with above     DEO Kiran    D: 08/27/2020 8:07:27       T: 08/27/2020 11:15:41     [email or web address]  Job#: [id number]     Doc#: [id number]    CC:

## 2020-08-28 VITALS
HEART RATE: 54 BPM | RESPIRATION RATE: 14 BRPM | WEIGHT: 299.3 LBS | DIASTOLIC BLOOD PRESSURE: 67 MMHG | HEIGHT: 75 IN | BODY MASS INDEX: 37.21 KG/M2 | SYSTOLIC BLOOD PRESSURE: 111 MMHG | TEMPERATURE: 97.5 F | OXYGEN SATURATION: 95 %

## 2020-08-28 LAB
ANION GAP SERPL CALCULATED.3IONS-SCNC: 10 MMOL/L (ref 4–16)
BUN BLDV-MCNC: 22 MG/DL (ref 6–23)
CALCIUM SERPL-MCNC: 9 MG/DL (ref 8.3–10.6)
CHLORIDE BLD-SCNC: 105 MMOL/L (ref 99–110)
CO2: 25 MMOL/L (ref 21–32)
CREAT SERPL-MCNC: 1.3 MG/DL (ref 0.9–1.3)
GFR AFRICAN AMERICAN: >60 ML/MIN/1.73M2
GFR NON-AFRICAN AMERICAN: 58 ML/MIN/1.73M2
GLUCOSE BLD-MCNC: 84 MG/DL (ref 70–99)
HCT VFR BLD CALC: 54.7 % (ref 42–52)
HEMOGLOBIN: 16.8 GM/DL (ref 13.5–18)
MCH RBC QN AUTO: 29.1 PG (ref 27–31)
MCHC RBC AUTO-ENTMCNC: 30.7 % (ref 32–36)
MCV RBC AUTO: 94.6 FL (ref 78–100)
PDW BLD-RTO: 13.1 % (ref 11.7–14.9)
PLATELET # BLD: 206 K/CU MM (ref 140–440)
PMV BLD AUTO: 8.5 FL (ref 7.5–11.1)
POTASSIUM SERPL-SCNC: 4.6 MMOL/L (ref 3.5–5.1)
RBC # BLD: 5.78 M/CU MM (ref 4.6–6.2)
SODIUM BLD-SCNC: 140 MMOL/L (ref 135–145)
WBC # BLD: 9.2 K/CU MM (ref 4–10.5)

## 2020-08-28 PROCEDURE — 36415 COLL VENOUS BLD VENIPUNCTURE: CPT

## 2020-08-28 PROCEDURE — 6370000000 HC RX 637 (ALT 250 FOR IP): Performed by: INTERNAL MEDICINE

## 2020-08-28 PROCEDURE — 2580000003 HC RX 258: Performed by: INTERNAL MEDICINE

## 2020-08-28 PROCEDURE — 94761 N-INVAS EAR/PLS OXIMETRY MLT: CPT

## 2020-08-28 PROCEDURE — 80048 BASIC METABOLIC PNL TOTAL CA: CPT

## 2020-08-28 PROCEDURE — 85027 COMPLETE CBC AUTOMATED: CPT

## 2020-08-28 RX ORDER — LOSARTAN POTASSIUM 100 MG/1
100 TABLET ORAL DAILY
Qty: 30 TABLET | Refills: 3 | Status: SHIPPED | OUTPATIENT
Start: 2020-08-28 | End: 2020-08-28 | Stop reason: SDUPTHER

## 2020-08-28 RX ORDER — METOPROLOL SUCCINATE 50 MG/1
50 TABLET, EXTENDED RELEASE ORAL DAILY
Qty: 30 TABLET | Refills: 3 | Status: SHIPPED | OUTPATIENT
Start: 2020-08-28

## 2020-08-28 RX ORDER — LOSARTAN POTASSIUM 100 MG/1
100 TABLET ORAL DAILY
Qty: 30 TABLET | Refills: 3 | Status: SHIPPED | OUTPATIENT
Start: 2020-08-28

## 2020-08-28 RX ORDER — ATORVASTATIN CALCIUM 40 MG/1
40 TABLET, FILM COATED ORAL NIGHTLY
Qty: 30 TABLET | Refills: 3 | Status: SHIPPED | OUTPATIENT
Start: 2020-08-28

## 2020-08-28 RX ORDER — NICOTINE 21 MG/24HR
1 PATCH, TRANSDERMAL 24 HOURS TRANSDERMAL DAILY
Qty: 30 PATCH | Refills: 3 | Status: SHIPPED | OUTPATIENT
Start: 2020-08-29

## 2020-08-28 RX ORDER — CLOPIDOGREL BISULFATE 75 MG/1
75 TABLET ORAL DAILY
Qty: 30 TABLET | Refills: 3 | Status: SHIPPED | OUTPATIENT
Start: 2020-08-29

## 2020-08-28 RX ORDER — METOPROLOL SUCCINATE 50 MG/1
50 TABLET, EXTENDED RELEASE ORAL DAILY
Qty: 30 TABLET | Refills: 3 | Status: SHIPPED | OUTPATIENT
Start: 2020-08-28 | End: 2020-08-28 | Stop reason: SDUPTHER

## 2020-08-28 RX ORDER — ASPIRIN 81 MG/1
81 TABLET, CHEWABLE ORAL DAILY
Qty: 30 TABLET | Refills: 3 | Status: SHIPPED | OUTPATIENT
Start: 2020-08-29

## 2020-08-28 RX ADMIN — CLOPIDOGREL BISULFATE 75 MG: 75 TABLET ORAL at 09:08

## 2020-08-28 RX ADMIN — ACETAMINOPHEN 650 MG: 325 TABLET ORAL at 04:17

## 2020-08-28 RX ADMIN — SODIUM CHLORIDE: 9 INJECTION, SOLUTION INTRAVENOUS at 04:13

## 2020-08-28 RX ADMIN — ASPIRIN 81 MG CHEWABLE TABLET 81 MG: 81 TABLET CHEWABLE at 09:08

## 2020-08-28 RX ADMIN — METOPROLOL SUCCINATE 25 MG: 25 TABLET, EXTENDED RELEASE ORAL at 09:08

## 2020-08-28 RX ADMIN — LOSARTAN POTASSIUM 100 MG: 100 TABLET, FILM COATED ORAL at 09:08

## 2020-08-28 ASSESSMENT — PAIN SCALES - GENERAL
PAINLEVEL_OUTOF10: 4
PAINLEVEL_OUTOF10: 0
PAINLEVEL_OUTOF10: 4

## 2020-08-28 ASSESSMENT — PAIN DESCRIPTION - PAIN TYPE: TYPE: ACUTE PAIN

## 2020-08-28 ASSESSMENT — PAIN DESCRIPTION - LOCATION: LOCATION: HEAD

## 2020-08-28 ASSESSMENT — PAIN DESCRIPTION - ORIENTATION: ORIENTATION: MID

## 2020-08-28 ASSESSMENT — PAIN DESCRIPTION - DESCRIPTORS: DESCRIPTORS: HEADACHE

## 2020-08-28 ASSESSMENT — PAIN DESCRIPTION - FREQUENCY: FREQUENCY: INTERMITTENT

## 2020-08-28 ASSESSMENT — PAIN - FUNCTIONAL ASSESSMENT: PAIN_FUNCTIONAL_ASSESSMENT: ACTIVITIES ARE NOT PREVENTED

## 2020-08-28 NOTE — DISCHARGE SUMMARY
Discharge Summary    Name:  Pippa Castillo /Age/Sex: 1970  (44 y.o. male)   MRN & CSN:  [id number] & [id number] Admission Date/Time: 2020  8:00 PM   Attending:  Ghanshyam Sutherland MD Discharging Physician: Memory Lundborg, MD     Hospital Course:   Pippa Castillo is a 44 y.o.  male  who presents with chest pain  concerns for ACS. He is status post cardiac cath    #Chest pain, consider ACS. #History of multiple PCI in the past  -Serial troponins were negative  -EKG no acute ST-T changes  -Stress test was positive for large apical MI that is chronic  -Is status post cardiac cath with KAREN placed in the LAD  -Echo: Echo shows EF 55%, moderate LVH, moderate regurgitation, mild diastolic dysfunction.  -Plan is for discharge today with outpatient follow-up with cardiology  -Continue DAPT    #History of ventricular thrombus  -Negative for current thrombus  -Cardiology patient will stop warfarin and continue on aspirin.       #HTN  - c/w lostartan     #RIYA on CKD?   - Cr on admission 1.6, today 1.3   - avoid nephrotoxic medication  -Continue oral hydration at home  -IV fluid normal saline hospital      The patient expressed appropriate understanding of and agreement with the discharge recommendations, medications, and plan. Consults this admission:  IP CONSULT TO HOSPITALIST  IP CONSULT TO CARDIOLOGY  IP CONSULT TO CARDIAC REHAB    Discharge Instruction:   Follow up appointments: Cardiology  Primary care physician:  within 2 weeks    Diet:  cardiac diet   Activity: activity as tolerated  Disposition: Discharged to:   [x]Home, []C, []SNF, []Acute Rehab, []Hospice   Condition on discharge: Stable    Discharge Medications:      Lucille Actis   Home Medication Instructions AQK:[id number]    Printed on:20 5508   Medication Information                      aspirin 325 MG tablet  Take 325 mg by mouth daily. isosorbide mononitrate (IMDUR) 30 MG CR tablet  Take 30 mg by mouth daily. losartan (COZAAR) 100 MG tablet               metoprolol succinate (TOPROL XL) 50 MG extended release tablet  daily. nitroGLYCERIN (NITROSTAT) 0.4 MG SL tablet  Place 0.4 mg under the tongue                 Objective Findings at Discharge:   /67   Pulse 54   Temp 97.7 °F (36.5 °C) (Oral)   Resp 16   Ht 6' 3\" (1.905 m)   Wt 299 lb 4.8 oz (135.8 kg)   SpO2 93%   BMI 37.41 kg/m²            PHYSICAL EXAM   GEN Awake male, sitting upright in bed in no apparent distress. Appears given age. EYES Pupils are equally round. No scleral erythema, discharge, or conjunctivitis. HENT Mucous membranes are moist. Oral pharynx without exudates, no evidence of thrush. NECK Supple, no apparent thyromegaly or masses. RESP Clear to auscultation, no wheezes, rales or rhonchi. Symmetric chest movement while on room air. CARDIO/VASC S1/S2 auscultated. Regular rate without appreciable murmurs, rubs, or gallops. No JVD or carotid bruits. Peripheral pulses equal bilaterally and palpable. No peripheral edema. GI Abdomen is soft without significant tenderness, masses, or guarding. Bowel sounds are normoactive. Rectal exam deferred.  No costovertebral angle tenderness. Normal appearing external genitalia. [place] catheter is not present. HEME/LYMPH No palpable cervical lymphadenopathy and no hepatosplenomegaly. No petechiae or ecchymoses. MSK No gross joint deformities. SKIN Normal coloration, warm, dry. NEURO Cranial nerves appear grossly intact, normal speech, no lateralizing weakness. PSYCH Awake, alert, oriented x 4. Affect appropriate.     BMP/CBC  Recent Labs     08/26/20  1748 08/27/20  0114 08/28/20  0335     --  140   K 5.0  --  4.6     --  105   CO2 25  --  25   BUN 23  --  22   CREATININE 1.3  --  1.3   WBC 9.5 8.8 9.2   HCT 56.1* 52.7* 54.7*    214 206       IMAGING:      Discharge Time of 35 minutes    Electronically signed by Gutierrez Sabillon MD on 8/28/2020 at 7:42

## 2020-08-28 NOTE — PROGRESS NOTES
Daily Progress Note     doing better  Ok for home  F/u in office in one week  Pt. Awake, alert and feeling better  HR stable, SB in the 50s, BP stable  No CP, SOB, wrist stable    CAD s/p PCI    Hx of Multiple PCI in the past    Recent stress and echo neg. OP    Came in with new and worsening symptoms    Trop neg. LHC done and s/p PCI to LAD and RCA    Feeling much better now    On DAPT, Statin, ARB and BB    Labs stable  Have him walk  Stable from cardiac standpoint-ok to D/C when ok with primary  F/u OP in one week    LH-8/27/20  LEFT MAIN PATENT  LAD PROXIMAL  90% TO 0% KAREN XIENCE 3.5X18MM STENT  RAMUS MILD DX  LCX MILD DX  RCA MID 90% KAREN XIENCE 4.0X33MM STENT  LVEDP 25  ASA AND PALVIX AND HEPARIN   NO COMPLICATIONS  RIGHT RADIAL APPROACH    PAST MEDICAL HISTORY:  CAD, CKD, COPD, hyperlipidemia, and MI, last one  in 2009.     PAST SURGICAL HISTORY:  Appendectomy, multiple PCIs, cholecystectomy,  incision and drainage.     FAMILY HISTORY:  Noncontributory.     SOCIAL HISTORY:  The patient is a current everyday smoker. He smokes a  pack a day. He has been doing this for about 15 years. He does not  drink.     ALLERGIES:  No known drug allergies.     HOME MEDICATIONS:  The patient was taking Lipitor 40 mg daily, aspirin  81 mg daily, losartan 100 mg daily, Toprol 50 mg daily.   Objective:   /67   Pulse 54   Temp 97.5 °F (36.4 °C) (Oral)   Resp 14   Ht 6' 3\" (1.905 m)   Wt 299 lb 4.8 oz (135.8 kg)   SpO2 95%   BMI 37.41 kg/m²       Intake/Output Summary (Last 24 hours) at 8/28/2020 0928  Last data filed at 8/27/2020 2332  Gross per 24 hour   Intake --   Output 2650 ml   Net -2650 ml       Medications:   Scheduled Meds:   losartan  100 mg Oral Daily    sodium chloride flush  10 mL Intravenous 2 times per day    atorvastatin  40 mg Oral Nightly    nicotine  1 patch Transdermal Daily    enoxaparin  40 mg Subcutaneous Daily    metoprolol succinate  25 mg Oral Daily    sodium chloride flush 10 mL Intravenous 2 times per day    aspirin  81 mg Oral Daily    clopidogrel  75 mg Oral Daily      Infusions:     PRN Meds:  sodium chloride flush, [DISCONTINUED] acetaminophen **OR** acetaminophen, polyethylene glycol, promethazine **OR** ondansetron, sodium chloride flush, sodium chloride flush, acetaminophen, oxyCODONE-acetaminophen       Physical Exam:  Vitals:    08/28/20 0830   BP: 111/67   Pulse: 54   Resp: 14   Temp: 97.5 °F (36.4 °C)   SpO2: 95%        General: AAO, NAD  Chest: Nontender  Cardiac: First and Second Heart Sounds are Normal, No Murmurs or Gallops noted  Lungs:Clear to auscultation and percussion. Abdomen: Soft, NT, ND, +BS  Extremities: No clubbing, no edema  Vascular:  Equal 2+ peripheral pulses. Lab Data:  CBC:   Recent Labs     08/26/20  1748 08/27/20  0114 08/28/20  0335   WBC 9.5 8.8 9.2   HGB 17.8 16.3 16.8   HCT 56.1* 52.7* 54.7*   MCV 93.5 95.6 94.6    214 206     BMP:   Recent Labs     08/26/20  1748 08/28/20  0335    140   K 5.0 4.6    105   CO2 25 25   BUN 23 22   CREATININE 1.3 1.3     LIVER PROFILE:   Recent Labs     08/26/20  1748   AST 15   ALT 21   BILITOT 0.6   ALKPHOS 85     PT/INR:   Recent Labs     08/26/20  1748   PROTIME 12.3   INR 1.02     APTT:   Recent Labs     08/26/20  1748   APTT 28.9     BNP:  No results for input(s): BNP in the last 72 hours.       Assessment:  Patient Active Problem List    Diagnosis Date Noted    Chest pain 07/09/2013    Angina, class III ([address] 11/25/2011    ASHD (arteriosclerotic heart disease) 11/25/2011    Lipidemia 11/25/2011    Cigarette smoker 11/25/2011    History of PTCA 2 11/25/2011    TARYN (obstructive sleep apnea) 11/25/2011    Obesity 11/25/2011    History of gastroesophageal reflux (GERD) 11/25/2011    HTN (hypertension) 11/25/2011    LVH (left ventricular hypertrophy) due to hypertensive disease 11/25/2011    Anxiety 11/25/2011    Medical non-compliance 11/25/2011    S/P cholecystectomy 11/25/2011    MI, old 11/25/2011       Electronically signed by Edgar Rosenberg PA-C on 8/28/2020 at 9:28 AM

## 2020-08-28 NOTE — CARE COORDINATION
Notified patient is ready for discharge. Approved and faxed 1x voucher to [facility] for atorvastatin and clopidogrel. Nicoderm and aspirin is otc and not covered by voucher. 12:19 Notified by pharmacy that two more meds were sent down. Losartan and metoprolol. Patient was on both meds prior to admission and they are not covered. Voucher is for new medications only.       Patient placed on flagged list.

## 2021-01-10 ENCOUNTER — APPOINTMENT (OUTPATIENT)
Dept: GENERAL RADIOLOGY | Age: 51
End: 2021-01-10
Payer: COMMERCIAL

## 2021-01-10 ENCOUNTER — HOSPITAL ENCOUNTER (OUTPATIENT)
Age: 51
Setting detail: OBSERVATION
Discharge: HOME | End: 2021-01-11
Attending: HOSPITALIST | Admitting: HOSPITALIST
Payer: COMMERCIAL

## 2021-01-10 DIAGNOSIS — R07.9 CHEST PAIN, UNSPECIFIED TYPE: Primary | ICD-10-CM

## 2021-01-10 LAB
ALBUMIN SERPL-MCNC: 3.6 GM/DL (ref 3.4–5)
ALP BLD-CCNC: 76 IU/L (ref 40–129)
ALT SERPL-CCNC: 25 U/L (ref 10–40)
ANION GAP SERPL CALCULATED.3IONS-SCNC: 7 MMOL/L (ref 4–16)
APTT: 26.7 SECONDS (ref 25.1–37.1)
AST SERPL-CCNC: 21 IU/L (ref 15–37)
BASOPHILS ABSOLUTE: 0 K/CU MM
BASOPHILS RELATIVE PERCENT: 0.5 % (ref 0–1)
BILIRUB SERPL-MCNC: 0.3 MG/DL (ref 0–1)
BUN BLDV-MCNC: 17 MG/DL (ref 6–23)
CALCIUM SERPL-MCNC: 8.9 MG/DL (ref 8.3–10.6)
CHLORIDE BLD-SCNC: 106 MMOL/L (ref 99–110)
CHOLESTEROL: 155 MG/DL
CO2: 24 MMOL/L (ref 21–32)
CREAT SERPL-MCNC: 1.2 MG/DL (ref 0.9–1.3)
D DIMER: <200 NG/ML(DDU)
DIFFERENTIAL TYPE: ABNORMAL
EOSINOPHILS ABSOLUTE: 0.2 K/CU MM
EOSINOPHILS RELATIVE PERCENT: 2.9 % (ref 0–3)
ESTIMATED AVERAGE GLUCOSE: 114 MG/DL
GFR AFRICAN AMERICAN: >60 ML/MIN/1.73M2
GFR NON-AFRICAN AMERICAN: >60 ML/MIN/1.73M2
GLUCOSE BLD-MCNC: 111 MG/DL (ref 70–99)
HBA1C MFR BLD: 5.6 % (ref 4.2–6.3)
HCT VFR BLD CALC: 51.6 % (ref 42–52)
HDLC SERPL-MCNC: 43 MG/DL
HEMOGLOBIN: 16.6 GM/DL (ref 13.5–18)
IMMATURE NEUTROPHIL %: 0.5 % (ref 0–0.43)
INR BLD: 0.95 INDEX
LDL CHOLESTEROL DIRECT: 96 MG/DL
LYMPHOCYTES ABSOLUTE: 1.8 K/CU MM
LYMPHOCYTES RELATIVE PERCENT: 27.6 % (ref 24–44)
MAGNESIUM: 2.2 MG/DL (ref 1.8–2.4)
MCH RBC QN AUTO: 30 PG (ref 27–31)
MCHC RBC AUTO-ENTMCNC: 32.2 % (ref 32–36)
MCV RBC AUTO: 93.3 FL (ref 78–100)
MONOCYTES ABSOLUTE: 0.5 K/CU MM
MONOCYTES RELATIVE PERCENT: 7.7 % (ref 0–4)
NUCLEATED RBC %: 0 %
PDW BLD-RTO: 13.2 % (ref 11.7–14.9)
PLATELET # BLD: 197 K/CU MM (ref 140–440)
PMV BLD AUTO: 8.2 FL (ref 7.5–11.1)
POTASSIUM SERPL-SCNC: 4.3 MMOL/L (ref 3.5–5.1)
PROTHROMBIN TIME: 11.5 SECONDS (ref 11.7–14.5)
RBC # BLD: 5.53 M/CU MM (ref 4.6–6.2)
SEGMENTED NEUTROPHILS ABSOLUTE COUNT: 4 K/CU MM
SEGMENTED NEUTROPHILS RELATIVE PERCENT: 60.8 % (ref 36–66)
SODIUM BLD-SCNC: 137 MMOL/L (ref 135–145)
T4 FREE: 0.81 NG/DL (ref 0.9–1.8)
TOTAL IMMATURE NEUTOROPHIL: 0.03 K/CU MM
TOTAL NUCLEATED RBC: 0 K/CU MM
TOTAL PROTEIN: 6.5 GM/DL (ref 6.4–8.2)
TRIGL SERPL-MCNC: 168 MG/DL
TROPONIN T: <0.01 NG/ML
TROPONIN T: <0.01 NG/ML
TSH HIGH SENSITIVITY: 1.79 UIU/ML (ref 0.27–4.2)
WBC # BLD: 6.6 K/CU MM (ref 4–10.5)

## 2021-01-10 PROCEDURE — 84443 ASSAY THYROID STIM HORMONE: CPT

## 2021-01-10 PROCEDURE — 84439 ASSAY OF FREE THYROXINE: CPT

## 2021-01-10 PROCEDURE — 85610 PROTHROMBIN TIME: CPT

## 2021-01-10 PROCEDURE — 71045 X-RAY EXAM CHEST 1 VIEW: CPT

## 2021-01-10 PROCEDURE — 6360000002 HC RX W HCPCS: Performed by: INTERNAL MEDICINE

## 2021-01-10 PROCEDURE — 85379 FIBRIN DEGRADATION QUANT: CPT

## 2021-01-10 PROCEDURE — 80061 LIPID PANEL: CPT

## 2021-01-10 PROCEDURE — G0378 HOSPITAL OBSERVATION PER HR: HCPCS

## 2021-01-10 PROCEDURE — 93005 ELECTROCARDIOGRAM TRACING: CPT | Performed by: PHYSICIAN ASSISTANT

## 2021-01-10 PROCEDURE — 96372 THER/PROPH/DIAG INJ SC/IM: CPT

## 2021-01-10 PROCEDURE — 96374 THER/PROPH/DIAG INJ IV PUSH: CPT

## 2021-01-10 PROCEDURE — 83735 ASSAY OF MAGNESIUM: CPT

## 2021-01-10 PROCEDURE — 83036 HEMOGLOBIN GLYCOSYLATED A1C: CPT

## 2021-01-10 PROCEDURE — 6370000000 HC RX 637 (ALT 250 FOR IP): Performed by: NURSE PRACTITIONER

## 2021-01-10 PROCEDURE — 36415 COLL VENOUS BLD VENIPUNCTURE: CPT

## 2021-01-10 PROCEDURE — 83721 ASSAY OF BLOOD LIPOPROTEIN: CPT

## 2021-01-10 PROCEDURE — 85730 THROMBOPLASTIN TIME PARTIAL: CPT

## 2021-01-10 PROCEDURE — 84484 ASSAY OF TROPONIN QUANT: CPT

## 2021-01-10 PROCEDURE — 2500000003 HC RX 250 WO HCPCS: Performed by: NURSE PRACTITIONER

## 2021-01-10 PROCEDURE — 80053 COMPREHEN METABOLIC PANEL: CPT

## 2021-01-10 PROCEDURE — 85025 COMPLETE CBC W/AUTO DIFF WBC: CPT

## 2021-01-10 PROCEDURE — 2580000003 HC RX 258: Performed by: NURSE PRACTITIONER

## 2021-01-10 PROCEDURE — 99285 EMERGENCY DEPT VISIT HI MDM: CPT

## 2021-01-10 RX ORDER — ATORVASTATIN CALCIUM 40 MG/1
40 TABLET, FILM COATED ORAL NIGHTLY
Status: DISCONTINUED | OUTPATIENT
Start: 2021-01-10 | End: 2021-01-11 | Stop reason: HOSPADM

## 2021-01-10 RX ORDER — NICOTINE 21 MG/24HR
1 PATCH, TRANSDERMAL 24 HOURS TRANSDERMAL DAILY
Status: DISCONTINUED | OUTPATIENT
Start: 2021-01-10 | End: 2021-01-11 | Stop reason: HOSPADM

## 2021-01-10 RX ORDER — SODIUM CHLORIDE 9 MG/ML
INJECTION, SOLUTION INTRAVENOUS CONTINUOUS
Status: DISCONTINUED | OUTPATIENT
Start: 2021-01-10 | End: 2021-01-11 | Stop reason: HOSPADM

## 2021-01-10 RX ORDER — ASPIRIN 81 MG/1
81 TABLET, CHEWABLE ORAL DAILY
Status: DISCONTINUED | OUTPATIENT
Start: 2021-01-10 | End: 2021-01-11 | Stop reason: HOSPADM

## 2021-01-10 RX ORDER — SODIUM CHLORIDE 0.9 % (FLUSH) 0.9 %
10 SYRINGE (ML) INJECTION PRN
Status: DISCONTINUED | OUTPATIENT
Start: 2021-01-10 | End: 2021-01-11 | Stop reason: HOSPADM

## 2021-01-10 RX ORDER — NITROGLYCERIN 0.4 MG/1
0.4 TABLET SUBLINGUAL EVERY 5 MIN PRN
Status: DISCONTINUED | OUTPATIENT
Start: 2021-01-10 | End: 2021-01-11 | Stop reason: HOSPADM

## 2021-01-10 RX ORDER — PROMETHAZINE HYDROCHLORIDE 25 MG/1
12.5 TABLET ORAL EVERY 6 HOURS PRN
Status: DISCONTINUED | OUTPATIENT
Start: 2021-01-10 | End: 2021-01-11 | Stop reason: HOSPADM

## 2021-01-10 RX ORDER — LOSARTAN POTASSIUM 100 MG/1
100 TABLET ORAL DAILY
Status: DISCONTINUED | OUTPATIENT
Start: 2021-01-10 | End: 2021-01-11 | Stop reason: HOSPADM

## 2021-01-10 RX ORDER — ISOSORBIDE MONONITRATE 30 MG/1
30 TABLET, EXTENDED RELEASE ORAL DAILY
Status: DISCONTINUED | OUTPATIENT
Start: 2021-01-10 | End: 2021-01-11 | Stop reason: HOSPADM

## 2021-01-10 RX ORDER — CLOPIDOGREL BISULFATE 75 MG/1
75 TABLET ORAL DAILY
Status: DISCONTINUED | OUTPATIENT
Start: 2021-01-10 | End: 2021-01-11 | Stop reason: HOSPADM

## 2021-01-10 RX ORDER — METOPROLOL SUCCINATE 50 MG/1
50 TABLET, EXTENDED RELEASE ORAL DAILY
Status: DISCONTINUED | OUTPATIENT
Start: 2021-01-10 | End: 2021-01-11 | Stop reason: HOSPADM

## 2021-01-10 RX ORDER — ONDANSETRON 2 MG/ML
4 INJECTION INTRAMUSCULAR; INTRAVENOUS EVERY 6 HOURS PRN
Status: DISCONTINUED | OUTPATIENT
Start: 2021-01-10 | End: 2021-01-11 | Stop reason: HOSPADM

## 2021-01-10 RX ORDER — POLYETHYLENE GLYCOL 3350 17 G/17G
17 POWDER, FOR SOLUTION ORAL DAILY PRN
Status: DISCONTINUED | OUTPATIENT
Start: 2021-01-10 | End: 2021-01-11 | Stop reason: HOSPADM

## 2021-01-10 RX ORDER — ACETAMINOPHEN 325 MG/1
650 TABLET ORAL EVERY 6 HOURS PRN
Status: DISCONTINUED | OUTPATIENT
Start: 2021-01-10 | End: 2021-01-11 | Stop reason: HOSPADM

## 2021-01-10 RX ORDER — ACETAMINOPHEN 650 MG/1
650 SUPPOSITORY RECTAL EVERY 6 HOURS PRN
Status: DISCONTINUED | OUTPATIENT
Start: 2021-01-10 | End: 2021-01-11 | Stop reason: HOSPADM

## 2021-01-10 RX ORDER — SODIUM CHLORIDE 0.9 % (FLUSH) 0.9 %
10 SYRINGE (ML) INJECTION EVERY 12 HOURS SCHEDULED
Status: DISCONTINUED | OUTPATIENT
Start: 2021-01-10 | End: 2021-01-11 | Stop reason: HOSPADM

## 2021-01-10 RX ADMIN — ISOSORBIDE MONONITRATE 30 MG: 30 TABLET, EXTENDED RELEASE ORAL at 22:27

## 2021-01-10 RX ADMIN — SODIUM CHLORIDE: 9 INJECTION, SOLUTION INTRAVENOUS at 22:28

## 2021-01-10 RX ADMIN — FAMOTIDINE 20 MG: 10 INJECTION INTRAVENOUS at 22:28

## 2021-01-10 RX ADMIN — LOSARTAN POTASSIUM 100 MG: 100 TABLET, FILM COATED ORAL at 22:27

## 2021-01-10 RX ADMIN — ATORVASTATIN CALCIUM 40 MG: 40 TABLET, FILM COATED ORAL at 22:27

## 2021-01-10 RX ADMIN — CLOPIDOGREL BISULFATE 75 MG: 75 TABLET ORAL at 22:27

## 2021-01-10 RX ADMIN — SODIUM CHLORIDE, PRESERVATIVE FREE 10 ML: 5 INJECTION INTRAVENOUS at 22:26

## 2021-01-10 RX ADMIN — ENOXAPARIN SODIUM 150 MG: 150 INJECTION SUBCUTANEOUS at 22:28

## 2021-01-10 RX ADMIN — ASPIRIN 81 MG CHEWABLE TABLET 81 MG: 81 TABLET CHEWABLE at 22:28

## 2021-01-10 ASSESSMENT — PAIN DESCRIPTION - LOCATION
LOCATION: CHEST
LOCATION: CHEST

## 2021-01-10 NOTE — ED PROVIDER NOTES
12 lead EKG per my interpretation:  Normal Sinus Rhythm 68  Axis is   Left axis deviation  QTc is  410  There is no specific T wave changes appreciated. There is no specific ST wave changes appreciated.     Prior EKG to compare with was not available           Merly Marie DO  01/10/21 3790

## 2021-01-10 NOTE — ED PROVIDER NOTES
EMERGENCY DEPARTMENT ENCOUNTER        PCP: No primary care provider on file. CHIEF COMPLAINT    Chief Complaint   Patient presents with    Chest Pain     cardiac hx of stemi, 8 stents. given 2 nitro and 325 mg aspirin pta       HPI    Sanjana Castillo is a 44 y.o. male with PMH of CAD sp PTCA, HTN, hyperlipidemia, CKD who presents with chest pain. Chest pain began about an hour prior to arrival while he was at rest, had been preparing meals for the week but was sitting down when symptoms stated. He describes a sharp, pressure across his chest which radiated into shoulders and upper arms bilaterally. Endorses associated diaphoresis. No N/V. No SOB. No recent cough, fever. No history of DVT/PE. Currently on Plavix and aspirin which he has been compliant with. He has significant cardiac history, most recent catheterization with stent placement in 8/2020 with Dr. Yaritza Prado. He took aspirin and nitro prior to arrival with EMS with marked improvement of pain, current pain rated 3/10 as pressure across chest.      REVIEW OF SYSTEMS    Constitutional:  Denies fever, chills. HENT:  Denies sore throat or ear pain   Cardiovascular:  See HPI. Denies palpitations,  Denies syncope   Respiratory:    See HPI.   Denies shortness of breath, or hemoptysis    GI:  Denies abdominal pain, nausea, vomiting, or diarrhea  :  Denies any urinary symptoms   Musculoskeletal:   Denies back pain   Skin:  Denies rash  Neurologic:  Denies lightheadedness, dizziness, headache, focal weakness or sensory changes   Endocrine:  Denies polyuria or polydypsia   Lymphatic:  Denies swollen glands     All other review of systems are negative  See HPI and nursing notes for additional information     PAST MEDICAL & SURGICAL HISTORY    Past Medical History:   Diagnosis Date    CAD (coronary artery disease)     Chronic kidney disease     stones 20 yrs ago    COPD [facility] obstructive pulmonary disease) ([facility] [facility]     Hyperlipidemia     [facility] infarction [facility] 1995, 1996, 2009    MI x3     Past Surgical History:   Procedure Laterality Date    APPENDECTOMY      CARDIAC SURGERY      heart caths    CHOLECYSTECTOMY      CORONARY ANGIOPLASTY WITH STENT PLACEMENT      CYST INCISION AND DRAINAGE         CURRENT MEDICATIONS        ALLERGIES    No Known Allergies    SOCIAL & FAMILY HISTORY    Social History     Socioeconomic History    Marital status:      Spouse name: None    Number of children: None    Years of education: None    Highest education level: None   Occupational History    None   Social Needs    Financial resource strain: None    Food insecurity     Worry: None     Inability: None    Transportation needs     Medical: None     Non-medical: None   Tobacco Use    Smoking status: Current Every Day Smoker     Packs/day: 1.00     Years: 15.00     Pack years: 15.00     Types: Cigarettes    Smokeless tobacco: Former User   Substance and Sexual Activity    Alcohol use: No    Drug use: No    Sexual activity: Yes   Lifestyle    Physical activity     Days per week: None     Minutes per session: None    Stress: None   Relationships    Social connections     Talks on phone: None     Gets together: None     Attends [employer] service: None     Active member of club or organization: None     Attends meetings of clubs or organizations: None     Relationship status: None    Intimate partner violence     Fear of current or ex partner: None     Emotionally abused: None     Physically abused: None     Forced sexual activity: None   Other Topics Concern    None   Social History Narrative    None     Family History   Problem Relation Age of Onset    Heart Disease Mother     Arthritis Mother     High Blood Pressure Mother     Heart Disease Father     Diabetes Brother     High Blood Pressure Brother     Mental Illness Brother        PHYSICAL EXAM    VITAL SIGNS: BP (!) 141/72   Pulse 57   Temp 97.8 °F (36.6 °C) (Oral)   Resp 14   Ht 6' 3\" (1.905 m)   Wt (!) 327 lb (148.3 kg)   SpO2 94%   BMI 40.87 kg/m²    Constitutional:  Well developed, well nourished, no acute distress   HENT:  Atraumatic, moist mucus membranes  Neck/Lymphatics: supple, no JVD, no swollen nodes  Respiratory:  Lungs Clear, no retractions   Cardiovascular:  Rate regular, regular Rhythm,  no murmurs/rubs/gallops  No carotid bruits or murmurs heard in carotids. Vascular: Radial pulses 2+ equal bilaterally  GI:  Soft, nontender, normal bowel sounds  Musculoskeletal:   No edema, no deformities  Integument:  Skin warm and dry, no petechiae   Neurologic:  Alert & oriented, normal speech  Psych: Pleasant affect, no hallucinations      EKG Interpretation  Please see ED physician's note for EKG interpretation      RADIOLOGY/PROCEDURES    CXR:   XR CHEST PORTABLE   Final Result   No acute process.                LABS:  Results for orders placed or performed during the hospital encounter of 01/10/21   CBC Auto Differential   Result Value Ref Range    WBC 6.6 4.0 - 10.5 K/CU MM    RBC 5.53 4.6 - 6.2 M/CU MM    Hemoglobin 16.6 13.5 - 18.0 GM/DL    Hematocrit 51.6 42 - 52 %    MCV 93.3 78 - 100 FL    MCH 30.0 27 - 31 PG    MCHC 32.2 32.0 - 36.0 %    RDW 13.2 11.7 - 14.9 %    Platelets 967 806 - 472 K/CU MM    MPV 8.2 7.5 - 11.1 FL    Differential Type AUTOMATED DIFFERENTIAL     Segs Relative 60.8 36 - 66 %    Lymphocytes % 27.6 24 - 44 %    Monocytes % 7.7 (H) 0 - 4 %    Eosinophils % 2.9 0 - 3 %    Basophils % 0.5 0 - 1 %    Segs Absolute 4.0 K/CU MM    Lymphocytes Absolute 1.8 K/CU MM    Monocytes Absolute 0.5 K/CU MM    Eosinophils Absolute 0.2 K/CU MM    Basophils Absolute 0.0 K/CU MM    Nucleated RBC % 0.0 %    Total Nucleated RBC 0.0 K/CU MM    Total Immature Neutrophil 0.03 K/CU MM    Immature Neutrophil % 0.5 (H) 0 - 0.43 %   Comprehensive Metabolic Panel   Result Value Ref Range    Sodium 137 135 - 145 MMOL/L    Potassium 4.3 3.5 - 5.1 MMOL/L    Chloride 106 99 - 110 mMol/L    CO2 24 21 - 32 MMOL/L    BUN 17 6 - 23 MG/DL    CREATININE 1.2 0.9 - 1.3 MG/DL    Glucose 111 (H) 70 - 99 MG/DL    Calcium 8.9 8.3 - 10.6 MG/DL    Alb 3.6 3.4 - 5.0 GM/DL    Total Protein 6.5 6.4 - 8.2 GM/DL    Total Bilirubin 0.3 0.0 - 1.0 MG/DL    ALT 25 10 - 40 U/L    AST 21 15 - 37 IU/L    Alkaline Phosphatase 76 40 - 129 IU/L    GFR Non-African American >60 >60 mL/min/1.73m2    GFR African American >60 >60 mL/min/1.73m2    Anion Gap 7 4 - 16   Troponin   Result Value Ref Range    Troponin T <0.010 <0.01 NG/ML   Troponin   Result Value Ref Range    Troponin T <0.010 <0.01 NG/ML   D-dimer, quantitative   Result Value Ref Range    D-Dimer, Quant <200 <230 NG/mL(DDU)   Hemoglobin A1c   Result Value Ref Range    Hemoglobin A1C 5.6 4.2 - 6.3 %    eAG 114 mg/dL   Protime/INR & PTT   Result Value Ref Range    Protime 11.5 (L) 11.7 - 14.5 SECONDS    INR 0.95 INDEX    aPTT 26.7 25.1 - 37.1 SECONDS   Magnesium   Result Value Ref Range    Magnesium 2.2 1.8 - 2.4 mg/dl   TSH without Reflex   Result Value Ref Range    TSH, High Sensitivity 1.790 0.270 - 4.20 uIu/ml   T4, free   Result Value Ref Range    T4 Free 0.81 (L) 0.9 - 1.8 NG/DL   Lipid panel   Result Value Ref Range    Triglycerides 168 (H) <150 MG/DL    Cholesterol 155 <200 MG/DL    HDL 43 >40 MG/DL    LDL Direct 96 <100 MG/DL   EKG 12 Lead   Result Value Ref Range    Ventricular Rate 68 BPM    Atrial Rate 68 BPM    P-R Interval 160 ms    QRS Duration 94 ms    Q-T Interval 386 ms    QTc Calculation (Bazett) 410 ms    P Axis 30 degrees    R Axis -34 degrees    T Axis -2 degrees    Diagnosis       Normal sinus rhythm  Left axis deviation  Minimal voltage criteria for LVH, may be normal variant  Abnormal ECG  When compared with ECG of 26-AUG-2020 17:22,  No significant change was found             ED COURSE & MEDICAL DECISION MAKING       Vital signs and nursing notes reviewed during ED course. I have independently evaluated this patient .   Supervising MD present in the Emergency Department, available for consultation, throughout entirety of  patient care. Patient presents as above with history concerning for cardiac process. He is hemodynamically stable on arrival with improvement of previous symptoms. Declines any pain medication. He has had nitro and aspirin by EMS. EKG interpreted by supervising physician without acute or ischemic changes. Troponin is negative. Remaining labs without emergent process. Chest x-ray without acute process. On subsequent evaluation, patient remains hemodynamically stable, continues to have improvement of symptoms, declines anything further for pain control. With patient's significant cardiac history and story of pain today, he has a heart score of 5. We will plan on admission for further cardiac evaluation as ACS cannot be entirely excluded. No history of DVT/PE and has been compliant with Plavix and aspirin. He is not tachycardic, tachypneic hypoxic throughout ED stay increasing my suspicion for PE. No infectious history. The patient and/or the family were informed of the results of any tests/labs/imaging, the treatment plan, and time was allotted to answer questions. See chart for details of medications given during the ED stay. Clinical  IMPRESSION    1. Chest pain, unspecified type      Admission    Comment: Please note this report has been produced using speech recognition software and may contain errors related to that system including errors in grammar, punctuation, and spelling, as well as words and phrases that may be inappropriate. If there are any questions or concerns please feel free to contact the dictating provider for clarification.       DEO Ritter  01/10/21 8832

## 2021-01-10 NOTE — ED NOTES
Report called to [facility] for room 150 Marlene Modi, [facility]  01/10/21 3263

## 2021-01-10 NOTE — ED NOTES
[facility] [address] [address] [place]  01/10/21 2190

## 2021-01-10 NOTE — H&P
History and Physical      Name:  Lisa Dee /Age/Sex: 1970  (44 y.o. male)   MRN & CSN:  [id number] & [id number] Admission Date/Time: 1/10/2021  2:59 PM   Location:  [facility] PCP: No primary care provider on file. Hospital Day: 1        Admitting Physician: Dr. Will Aguilera and Plan:   Lisa Dee is a 44 y.o. male who presents with Chest Pain (cardiac hx of stemi, 8 stents. given 2 nitro and 325 mg aspirin pta)     Unstable angina. Hx of CAD s/p [facility] () KAREN x 2 LAD/ RCA. Initial troponin neg. EKG shows no acute ischemia   Admit to Med/Surg under OBSERVATION status. Telemetry monitoring    Serial troponin level and repeat EKG in AM   Cardiology consult. Follows with Dr. Gagandeep Collins   Pending labs for further risk stratification    Antiplatelet/BB/Statin/sl Nitro on medication regimen.  Essential hypertension- continue home antihypertensive regimen- Monitor BP trends.  Obesity- Body mass index is 37.37 kg/m². Lifestyle modifications needed     Tobacco Use   Smoking cessation protocol   Nicotine supplementation refuse     Patient case discussed with ED provider    Diet Cardiac   DVT Prophylaxis [x] Lovenox, []  Heparin, [] SCDs, [] Ambulation  [] Long term AC   GI Prophylaxis [] PPI,  [] H2 Blocker,  [] Carafate,  [x] Diet/Tube Feeds   Code Status Full     Disposition Admit to obs. Patient plans to return home upon discharge   MDM [] Low, [x] Moderate,[]  High     -Patient assessment and plan discussed and reviewed with admitting physician: Gabriel Alvarez MD.       History of Present Illness:     Chief Complaint: Chest Pain (cardiac hx of stemi, 8 stents. given 2 nitro and 325 mg aspirin pta)      Lisa Dee is a 44 y.o. male who presents with chest pain. Significant cardiac history with a recent heart cath -stenting to LAD/RCA 2020.   He reports onset of pain approximately 1 hour prior to arrival.  Describes as a sharp substernal pain radiating to neck back and arms. Upon EMS arrival he was given aspirin and nitro which relieved his pain. He currently denies pain. Denies any precipitating factors reports standing in the kitchen making meals prior to onset. Associated diaphoresis and shortness of breath. Ten point ROS: reviewed negative, unless as noted in above HPI. Objective:   No intake or output data in the 24 hours ending 01/10/21 1639     Vitals:   Vitals:    01/10/21 1549 01/10/21 1602 01/10/21 1606 01/10/21 1632   BP: 134/71 117/71  103/63   Pulse: 66 62  57   Resp: 20  17 16   Temp: 97.8 °F (36.6 °C)      TempSrc: Oral      SpO2: 97% 94%  95%   Weight:       Height:           Physical Exam: 01/10/21     Gen:  awake, alert, cooperative, no apparent distress obese body habitus  Head/Eyes:  Normocephalic atraumatic, EOMI   NECK:   symmetrical, trachea midline  LUNGS: Normal Effort/ symmetry movement   CARDIOVASCULAR:  Normal rate Tele SB-SR  ABDOMEN: Non tender, non distended, no HSM noted. MUSCULOSKELETAL: no gross deformities  NEUROLOGIC: Alert and Oriented,  Cranial nerves II-XII are grossly intact. SKIN:  no bruising or bleeding, normal skin color,  no redness      Past Medical History:      Past Medical History:   Diagnosis Date    CAD (coronary artery disease)     Chronic kidney disease     stones 20 yrs ago    COPD (chronic obstructive pulmonary disease) ([facility] [address]     Hyperlipidemia     Myocardial infarction ([facility] [address] 1995, 1996, 2009    MI x3       Past Surgical  History:    has a past surgical history that includes Appendectomy; Cholecystectomy; Cardiac surgery; cyst incision and drainage; and Coronary angioplasty with stent. Social History:    FAM HX: Reviewed  family history includes Arthritis in his mother; Diabetes in his brother; Heart Disease in his father and mother; High Blood Pressure in his brother and mother; Mental Illness in his brother.     Soc HX:   Social History     Socioeconomic History    Marital status:  Spouse name: None    Number of children: None    Years of education: None    Highest education level: None   Occupational History    None   Social Needs    Financial resource strain: None    Food insecurity     Worry: None     Inability: None    Transportation needs     Medical: None     Non-medical: None   Tobacco Use    Smoking status: Current Every Day Smoker     Packs/day: 1.00     Years: 15.00     Pack years: 15.00     Types: Cigarettes    Smokeless tobacco: Former User   Substance and Sexual Activity    Alcohol use: No    Drug use: No    Sexual activity: Yes   Lifestyle    Physical activity     Days per week: None     Minutes per session: None    Stress: None   Relationships    Social connections     Talks on phone: None     Gets together: None     Attends L[employer] service: None     Active member of club or organization: None     Attends meetings of clubs or organizations: None     Relationship status: None    Intimate partner violence     Fear of current or ex partner: None     Emotionally abused: None     Physically abused: None     Forced sexual activity: None   Other Topics Concern    None   Social History Narrative    None     TOBACCO:   reports that he has been smoking cigarettes. He has a 15.00 pack-year smoking history. He has quit using smokeless tobacco.  ETOH:   reports no history of alcohol use. Drugs:  reports no history of drug use. Allergies: No Known Allergies    Home Medications:     Prior to Admission medications    Medication Sig Start Date End Date Taking?  Authorizing Provider   aspirin 81 MG chewable tablet Take 1 tablet by mouth daily 8/29/20   Ravi Gimenez MD   atorvastatin (LIPITOR) 40 MG tablet Take 1 tablet by mouth nightly 8/28/20   Ravi Gimenez MD   clopidogrel (PLAVIX) 75 MG tablet Take 1 tablet by mouth daily 8/29/20   Ravi Gimenez MD   nicotine (NICODERM CQ) 14 MG/24HR Place 1 patch onto the skin daily 8/29/20   Ravi Gimenez MD   losartan (COZAAR) 100 MG tablet Take 1 tablet by mouth daily 8/28/20   Aleksey Estrada MD   metoprolol succinate (TOPROL XL) 50 MG extended release tablet Take 1 tablet by mouth daily 8/28/20   Aleksey Estrada MD   nitroGLYCERIN (NITROSTAT) 0.4 MG SL tablet Place 0.4 mg under the tongue 2/1/18   Historical Provider, MD   isosorbide mononitrate (IMDUR) 30 MG CR tablet Take 30 mg by mouth daily. Historical Provider, MD         Medications:   Medications:    Infusions:   PRN Meds:     Data:     Laboratory this visit:  Reviewed  Recent Labs     01/10/21  1500   WBC 6.6   HGB 16.6   HCT 51.6         Recent Labs     01/10/21  1500      K 4.3      CO2 24   BUN 17   CREATININE 1.2     Recent Labs     01/10/21  1500   AST 21   ALT 25   BILITOT 0.3   ALKPHOS 76     No results for input(s): INR in the last 72 hours. Radiology this visit:  Reviewed. No results found.       EKG this visit:  Reviewed             Electronically signed by OMAR Marks CNP on 1/10/2021 at 4:39 PM

## 2021-01-11 ENCOUNTER — APPOINTMENT (OUTPATIENT)
Dept: NUCLEAR MEDICINE | Age: 51
End: 2021-01-11
Payer: COMMERCIAL

## 2021-01-11 VITALS
HEIGHT: 75 IN | DIASTOLIC BLOOD PRESSURE: 60 MMHG | HEART RATE: 47 BPM | RESPIRATION RATE: 16 BRPM | SYSTOLIC BLOOD PRESSURE: 102 MMHG | OXYGEN SATURATION: 96 % | BODY MASS INDEX: 39.17 KG/M2 | WEIGHT: 315 LBS | TEMPERATURE: 97.9 F

## 2021-01-11 LAB
ALBUMIN SERPL-MCNC: 3.3 GM/DL (ref 3.4–5)
ALP BLD-CCNC: 67 IU/L (ref 40–128)
ALT SERPL-CCNC: 22 U/L (ref 10–40)
ANION GAP SERPL CALCULATED.3IONS-SCNC: 8 MMOL/L (ref 4–16)
AST SERPL-CCNC: 17 IU/L (ref 15–37)
BILIRUB SERPL-MCNC: 0.4 MG/DL (ref 0–1)
BUN BLDV-MCNC: 16 MG/DL (ref 6–23)
CALCIUM SERPL-MCNC: 8 MG/DL (ref 8.3–10.6)
CHLORIDE BLD-SCNC: 106 MMOL/L (ref 99–110)
CO2: 24 MMOL/L (ref 21–32)
CREAT SERPL-MCNC: 1.3 MG/DL (ref 0.9–1.3)
GFR AFRICAN AMERICAN: >60 ML/MIN/1.73M2
GFR NON-AFRICAN AMERICAN: 58 ML/MIN/1.73M2
GLUCOSE BLD-MCNC: 86 MG/DL (ref 70–99)
HCT VFR BLD CALC: 48.7 % (ref 42–52)
HEMOGLOBIN: 15.5 GM/DL (ref 13.5–18)
LV EF: 52 %
LV EF: 58 %
LVEF MODALITY: NORMAL
LVEF MODALITY: NORMAL
MCH RBC QN AUTO: 29.2 PG (ref 27–31)
MCHC RBC AUTO-ENTMCNC: 31.8 % (ref 32–36)
MCV RBC AUTO: 91.9 FL (ref 78–100)
PDW BLD-RTO: 13.7 % (ref 11.7–14.9)
PLATELET # BLD: 185 K/CU MM (ref 140–440)
PMV BLD AUTO: 8.6 FL (ref 7.5–11.1)
POTASSIUM SERPL-SCNC: 4.5 MMOL/L (ref 3.5–5.1)
RBC # BLD: 5.3 M/CU MM (ref 4.6–6.2)
SODIUM BLD-SCNC: 138 MMOL/L (ref 135–145)
TOTAL PROTEIN: 5.5 GM/DL (ref 6.4–8.2)
TROPONIN T: <0.01 NG/ML
WBC # BLD: 7.6 K/CU MM (ref 4–10.5)

## 2021-01-11 PROCEDURE — 93005 ELECTROCARDIOGRAM TRACING: CPT | Performed by: NURSE PRACTITIONER

## 2021-01-11 PROCEDURE — 85027 COMPLETE CBC AUTOMATED: CPT

## 2021-01-11 PROCEDURE — 2580000003 HC RX 258: Performed by: PHYSICIAN ASSISTANT

## 2021-01-11 PROCEDURE — 6370000000 HC RX 637 (ALT 250 FOR IP): Performed by: NURSE PRACTITIONER

## 2021-01-11 PROCEDURE — 93306 TTE W/DOPPLER COMPLETE: CPT

## 2021-01-11 PROCEDURE — G0378 HOSPITAL OBSERVATION PER HR: HCPCS

## 2021-01-11 PROCEDURE — 78452 HT MUSCLE IMAGE SPECT MULT: CPT

## 2021-01-11 PROCEDURE — 3430000000 HC RX DIAGNOSTIC RADIOPHARMACEUTICAL: Performed by: INTERNAL MEDICINE

## 2021-01-11 PROCEDURE — 83721 ASSAY OF BLOOD LIPOPROTEIN: CPT

## 2021-01-11 PROCEDURE — 36415 COLL VENOUS BLD VENIPUNCTURE: CPT

## 2021-01-11 PROCEDURE — 6360000002 HC RX W HCPCS: Performed by: INTERNAL MEDICINE

## 2021-01-11 PROCEDURE — 80053 COMPREHEN METABOLIC PANEL: CPT

## 2021-01-11 PROCEDURE — A9500 TC99M SESTAMIBI: HCPCS | Performed by: INTERNAL MEDICINE

## 2021-01-11 PROCEDURE — 93017 CV STRESS TEST TRACING ONLY: CPT

## 2021-01-11 PROCEDURE — 2580000003 HC RX 258: Performed by: NURSE PRACTITIONER

## 2021-01-11 RX ORDER — NITROGLYCERIN 0.4 MG/1
TABLET SUBLINGUAL
Qty: 25 TABLET | Refills: 3 | Status: SHIPPED | OUTPATIENT
Start: 2021-01-11

## 2021-01-11 RX ORDER — 0.9 % SODIUM CHLORIDE 0.9 %
500 INTRAVENOUS SOLUTION INTRAVENOUS ONCE
Status: COMPLETED | OUTPATIENT
Start: 2021-01-11 | End: 2021-01-11

## 2021-01-11 RX ADMIN — Medication 30 MILLICURIE: at 10:30

## 2021-01-11 RX ADMIN — Medication 10 MILLICURIE: at 08:55

## 2021-01-11 RX ADMIN — ACETAMINOPHEN 650 MG: 325 TABLET ORAL at 06:05

## 2021-01-11 RX ADMIN — SODIUM CHLORIDE 500 ML: 9 INJECTION, SOLUTION INTRAVENOUS at 02:31

## 2021-01-11 RX ADMIN — ACETAMINOPHEN 650 MG: 325 TABLET ORAL at 12:53

## 2021-01-11 RX ADMIN — REGADENOSON 0.4 MG: 0.08 INJECTION, SOLUTION INTRAVENOUS at 10:24

## 2021-01-11 RX ADMIN — SODIUM CHLORIDE: 9 INJECTION, SOLUTION INTRAVENOUS at 03:34

## 2021-01-11 ASSESSMENT — PAIN SCALES - GENERAL
PAINLEVEL_OUTOF10: 8
PAINLEVEL_OUTOF10: 0
PAINLEVEL_OUTOF10: 3
PAINLEVEL_OUTOF10: 0

## 2021-01-11 NOTE — CARE COORDINATION
Reviewed chart. Called patient for discharge planning with no answer at this time. PCP information added to AVS. Will re-attempt as time allows.

## 2021-01-11 NOTE — CONSULTS
[address] [place] [address], [address] [facility]                                  CONSULTATION    PATIENT NAME: Xiao Ware                      :        1970  MED REC NO:   [id number]                          ROOM:       3011  ACCOUNT NO:   [de-identified]                           ADMIT DATE: 01/10/2021  PROVIDER:     Cristo Saleh MD    CONSULT DATE:  01/10/2021    INDICATION:  Chest pain. HISTORY OF PRESENT ILLNESS:  This is a 17-year-old male patient with a  history of coronary artery disease present status post angioplasty done  in 2020. He is a . He said he was making his meals. Started having chest pain, chest pressure, heaviness across the  shoulders present. He broke on with sweats also. He took nitro and he  is being treated right now. His pain had improved by the time EMS has  arrived. Right now, he is pain free. The patient had an echo done back in 2020. Echo shows LV function  was preserved. The patient had a heart catheterization done in 2020. Left main was patent. LAD had 90% stenosis, which was stented. Ramus  had mild disease. Circ had mild disease. RCA had 90% stenosis, which  was stented also. PAST MEDICAL HISTORY:  Coronary artery disease status post angioplasty  done of the LAD and RCA, hypertension, hyperlipidemia, and history of  having MI in the past.  . Venous insufficiency present.   _____. PAST SURGICAL HISTORY:  Multiple PCIs, gallbladder surgery, incision and  drainage, and appendectomy. SOCIAL HISTORY:  He does not smoke. Does not drink alcohol. ALLERGIES:  NKDA. MEDICATIONS:  He has been compliant. He has been on aspirin, Lipitor,  Plavix, Cozaar, and Toprol. PHYSICAL EXAMINATION:  GENERAL:  The patient is awake, alert, and answering questions, not in  acute distress.   VITAL SIGNS:  Temperature is afebrile, pulse is 57, blood pressure 141/72, sinus rhythm. HEENT:  Head is normocephalic and atraumatic. Pupils are equal and  reactive to light. CHEST:  Equal expansion. LUNGS:  Clear to auscultation. No wheezing or rhonchi. HEART:  Regular rate and rhythm. ABDOMEN:  Soft and nontender. Bowel sounds are present. No  hepatosplenomegaly or guarding appreciated. EXTREMITIES:  No cyanosis or clubbing noted. NEUROLOGIC:  Cranial nerves II through XII are grossly intact. LABORATORY DATA:  Troponins are negative. CBC is within normal range. LFTs are normal. BUN is 17, creatinine 1.2. IMPRESSION:  A 61-year-old male patient comes with chest pain. He is  pain free right now. Pain is relieved with nitro. He does have  coronary artery disease. We will make further recommendations based on  the hospital course. Most likely we will plan for heart cath on him  because of his risk factors and his story.         Alessandro Reddy MD    D: 01/10/2021 19:15:31       T: 01/10/2021 20:40:56     [email or web address]  Job#: [id number]     Doc#: [id number]    CC:

## 2021-01-11 NOTE — DISCHARGE SUMMARY
Hermila Paul Second 1970 [id number]  PCP:  No primary care provider on file. Admit date: 1/10/2021  Admitting Physician: Kelsea Ivan MD    Discharge date: 1/11/2021 Discharge Physician: Arley Strauss MD         Hospital Course and Discharge Diagnoses Include:    Chest Pain r/o ACS  - ACS ruled out  - hx of CAD with recent stents placed 08/2020  - trops neg x 2  - stress test WNL  - echo ef 55-60%, mod AR  - asa, plavix, statin, metoprolol  - cardio following, okay to d/c    T4 0.81 but TSH WNL, recommend repeat in 6 weeks         Essential hypertension- continue home antihypertensive regimen- Monitor BP trends.      Obesity- Body mass index is 37.37 kg/m². Lifestyle modifications needed      Tobacco Use              Smoking cessation protocol              Nicotine supplementation refuse        Physical Exam on Discharge date: 01/11/21  Gen:  awake, alert, no apparent distress  Head/Eyes:  Normocephalic atraumatic, EOMI   NECK:   symmetrical, trachea midline  LUNGS: Normal Effort   CARDIOVASCULAR:  Normal rate  ABDOMEN:  non distended  MUSCULOSKELETAL:  ROM WNL  NEUROLOGIC: Alert and Oriented,  Cranial nerves II-XII are grossly intact. SKIN:  no bruising or bleeding, normal skin color,  no redness    Procedures:  See above  Xr Chest Portable    Result Date: 1/10/2021  EXAMINATION: ONE XRAY VIEW OF THE CHEST 1/10/2021 4:32 pm COMPARISON: Chest radiograph 08/26/2020. CT chest angiogram 08/27/2020. HISTORY: ORDERING SYSTEM PROVIDED HISTORY: Chest pain TECHNOLOGIST PROVIDED HISTORY: Reason for exam:->Chest pain Reason for Exam: chest pain FINDINGS: The lungs are without acute focal process. There is no effusion or pneumothorax. The cardiomediastinal silhouette is without acute process. The osseous structures are without acute process. No acute process.      Nm Myocardial Spect Rest Exercise Or Rx    Result Date: 1/11/2021  Cardiac Perfusion Imaging   Demographics   Patient Name       [facility] Lexiscan infusion. Procedure Medications   - Lexiscan I.V. bolus (over 15sec.) 0.4 mg admininstered @ 01/11/2021 10:30. Imaging Protocols   Rest                             Stress   Isotope:Sestamibi 99mTc          Isotope: Sestamibi 99mTc  Isotope dose:10.7 mCi            Isotope dose:32.4 mCi  Administration route: I.V. Administration route: I.V. Injection Date:01/11/2021 08:55  Injection Date:01/11/2021 10:30  Scan Date:01/11/2021 09:40       Scan Date:01/11/2021 11:15   Technique:        SPECT          Technique:        Gated                                                     SPECT   Procedure Description   Upon patient arrival, the patient is identified using two identifiers and  the physician order is verified. An IV is established and 8-11mCi of 99mTc  Sestamibi is intravenously injected and followed with 10mL 0.9% Normal  Saline flush. A circulation period of 45 minutes occurs prior to resting  SPECT imaging. After imaging is complete the patient is escorted to the  stress lab. The patient is connected to the ECG and blood pressure is  measured. The RN starts the stress portion of the exam and rapidly  intravenously injects Lexiscan (regadenosine) 0.4mg over a period of 10 to15  seconds and follows with 5mL 0.9% Normal Saline flush. Immediately following  the Nuclear Technologist intravenously injects 22-33mCi of 99mTc Sestamibi  and 5mL 0.9% Normal Saline flush. After completion, recovery, and removal of  the IV, the patient rests during the second circulation period of 45  minutes. Final stress SPECT gated imaging is performed. The patient may  return home or to their room after stress imaging. The images are processed  and final charting is completed and sent to the appropriate cardiologist for  interpretation and reporting.    Perfusion Interpretation   Normal perfusion uptake noted in the anterior/septal/lateral and inferior  wall  fixed defect in the apex due to artifact  gating shows EF 52% no reversible ischemia noted  Imaging Results    Summed scores     - Summed stress score: 7     - Summed rest score: 6     - Summed difference score:    1   Rest ejection  Ejection fraction:52 %  EDV :151 ml  ESV :72 ml  Stroke volume :79 ml  Medical History   Accession#:  [id number]  Admission Data Admission date: 01/10/2021 Admission Time: 14:59 Hospital Status: Outpatient. Significant Diagnostic Studies at discharge:   CBC:   Lab Results   Component Value Date    WBC 7.6 01/11/2021    RBC 5.30 01/11/2021    HGB 15.5 01/11/2021    HCT 48.7 01/11/2021    MCV 91.9 01/11/2021    MCH 29.2 01/11/2021    MCHC 31.8 01/11/2021    RDW 13.7 01/11/2021     01/11/2021    MPV 8.6 01/11/2021       Patient Instructions:     Medication List      CONTINUE taking these medications    aspirin 81 MG chewable tablet  Take 1 tablet by mouth daily     atorvastatin 40 MG tablet  Commonly known as: LIPITOR  Take 1 tablet by mouth nightly     clopidogrel 75 MG tablet  Commonly known as: PLAVIX  Take 1 tablet by mouth daily     isosorbide mononitrate 30 MG extended release tablet  Commonly known as: IMDUR     losartan 100 MG tablet  Commonly known as: COZAAR  Take 1 tablet by mouth daily     metoprolol succinate 50 MG extended release tablet  Commonly known as: TOPROL XL  Take 1 tablet by mouth daily     nicotine 14 MG/24HR  Commonly known as: NICODERM CQ  Place 1 patch onto the skin daily     nitroGLYCERIN 0.4 MG SL tablet  Commonly known as: NITROSTAT               Code Status: Full Code     Consults:   IP CONSULT TO HOSPITALIST  IP CONSULT TO CARDIOLOGY    Diet: cardiac diet    Activity: activity as tolerated   Work:    Discharged Condition: good    Prognosis: Fair - Good    Disposition: home      Follow-up with   1. PCP within   5-7    Days    Follow up labs: none       Discharge Physician Signed: Nash Knox M.D.     The patient was seen and examined on day of discharge and this discharge summary is in conjunction with any daily progress note from day of discharge.   Time spent on discharge in the examination, evaluation, counseling and review of medications and discharge plan: 34 minutes

## 2021-01-11 NOTE — PROGRESS NOTES
Hospitalist Progress Note      Name:  Courtney Tellez /Age/Sex: 1970  (44 y.o. male)   MRN & CSN:  [id number] & [id number] Admission Date/Time: 1/10/2021  2:59 PM   Location:  [facility] PCP: No primary care provider on file. Courtney Tellez is a 44 y.o.  male  who presents with Chest Pain (cardiac hx of stemi, 8 stents. given 2 nitro and 325 mg aspirin pta)      Assessment and Plan:   Chest Pain r/o ACS  - hx of CAD with recent stents placed 2020  - trops neg x 2  - stress test pending  - echo pending  - asa, plavix, statin, metoprolol  - cardio following        Essential hypertension- continue home antihypertensive regimen- Monitor BP trends.      Obesity- Body mass index is 37.37 kg/m².  Lifestyle modifications needed      Tobacco Use              Smoking cessation protocol              Nicotine supplementation refuse            Diet Diet NPO, After Midnight   Code Status Full Code     Medications:   Medications:    sodium chloride flush  10 mL Intravenous 2 times per day    famotidine (PEPCID) injection  20 mg Intravenous BID    aspirin  81 mg Oral Daily    atorvastatin  40 mg Oral Nightly    clopidogrel  75 mg Oral Daily    isosorbide mononitrate  30 mg Oral Daily    losartan  100 mg Oral Daily    metoprolol succinate  50 mg Oral Daily    nicotine  1 patch Transdermal Daily      Infusions:    sodium chloride 75 mL/hr at 21 0334     PRN Meds:     sodium chloride flush, 10 mL, PRN      promethazine, 12.5 mg, Q6H PRN    Or      ondansetron, 4 mg, Q6H PRN      acetaminophen, 650 mg, Q6H PRN    Or      acetaminophen, 650 mg, Q6H PRN      polyethylene glycol, 17 g, Daily PRN      nitroGLYCERIN, 0.4 mg, Q5 Min PRN      Subjective:   No CP feels better now    Objective:   No intake or output data in the 24 hours ending 21 1217   Vitals:   Vitals:    21 1139   BP: 122/65   Pulse:    Resp:    Temp:    SpO2: 98%     Physical Exam:

## 2021-01-11 NOTE — PROGRESS NOTES
Daily Progress Note      No complains overnight  Heart rate and BP stable  Ok to d/c home  Stress test negative for ischemia normal EF  F/u in office in one week      Pt. For stress and echo today  HR stable, BP stable    Chest pain    Hx of CAD s/p PCI x2 8/20    Trop neg. Planning for stress and echo today    Will follow  Further recs after testing done    [facility]  The patient had a heart catheterization done in 08/2020. Left main was patent. LAD had 90% stenosis, which was stented. Ramus  had mild disease. Circ had mild disease. RCA had 90% stenosis, which  was stented also. PAST MEDICAL HISTORY:  Coronary artery disease status post angioplasty  done of the LAD and RCA, hypertension, hyperlipidemia, and history of  having MI in the past.  . Venous insufficiency present.   _____.     PAST SURGICAL HISTORY:  Multiple PCIs, gallbladder surgery, incision and  drainage, and appendectomy.     SOCIAL HISTORY:  He does not smoke. Does not drink alcohol.     ALLERGIES:  NKDA.     MEDICATIONS:  He has been compliant. He has been on aspirin, Lipitor,  Plavix, Cozaar, and Toprol.   Objective:   /60   Pulse 56   Temp 97.9 °F (36.6 °C) (Oral)   Resp 16   Ht 6' 3\" (1.905 m)   Wt (!) 327 lb (148.3 kg)   SpO2 95%   BMI 40.87 kg/m²   No intake or output data in the 24 hours ending 01/11/21 0944    Medications:   Scheduled Meds:   sodium chloride flush  10 mL Intravenous 2 times per day    famotidine (PEPCID) injection  20 mg Intravenous BID    aspirin  81 mg Oral Daily    atorvastatin  40 mg Oral Nightly    clopidogrel  75 mg Oral Daily    isosorbide mononitrate  30 mg Oral Daily    losartan  100 mg Oral Daily    metoprolol succinate  50 mg Oral Daily    nicotine  1 patch Transdermal Daily      Infusions:   sodium chloride 75 mL/hr at 01/11/21 0334      PRN Meds:  technetium sestamibi, technetium sestamibi, sodium chloride flush, promethazine **OR** ondansetron, acetaminophen **OR** acetaminophen, polyethylene glycol, nitroGLYCERIN       Physical Exam:  Vitals:    01/11/21 0830   BP: 102/60   Pulse: 56   Resp: 16   Temp: 97.9 °F (36.6 °C)   SpO2: 95%        General: AAO, NAD  Chest: Nontender  Cardiac: First and Second Heart Sounds are Normal, No Murmurs or Gallops noted  Lungs:Clear to auscultation and percussion. Abdomen: Soft, NT, ND, +BS  Extremities: No clubbing, no edema  Vascular:  Equal 2+ peripheral pulses. Lab Data:  CBC:   Recent Labs     01/10/21  1500 01/11/21 0253   WBC 6.6 7.6   HGB 16.6 15.5   HCT 51.6 48.7   MCV 93.3 91.9    185     BMP:   Recent Labs     01/10/21  1500 01/11/21 0253    138   K 4.3 4.5    106   CO2 24 24   BUN 17 16   CREATININE 1.2 1.3     LIVER PROFILE:   Recent Labs     01/10/21  1500 01/11/21 0253   AST 21 17   ALT 25 22   BILITOT 0.3 0.4   ALKPHOS 76 67     PT/INR:   Recent Labs     01/10/21  1940   PROTIME 11.5*   INR 0.95     APTT:   Recent Labs     01/10/21  1940   APTT 26.7     BNP:  No results for input(s): BNP in the last 72 hours.       Assessment:  Patient Active Problem List    Diagnosis Date Noted    Chest pain 07/09/2013    Angina, class III ([address] 11/25/2011    ASHD (arteriosclerotic heart disease) 11/25/2011    Lipidemia 11/25/2011    Cigarette smoker 11/25/2011    History of PTCA 2 11/25/2011    TARYN (obstructive sleep apnea) 11/25/2011    Obesity 11/25/2011    History of gastroesophageal reflux (GERD) 11/25/2011    HTN (hypertension) 11/25/2011    LVH (left ventricular hypertrophy) due to hypertensive disease 11/25/2011    Anxiety 11/25/2011    Medical non-compliance 11/25/2011    S/P cholecystectomy 11/25/2011    MI, old 11/25/2011       Electronically signed by Daniela Castro PA-C on 1/11/2021 at 9:44 AM

## 2021-01-12 LAB
EKG ATRIAL RATE: 50 BPM
EKG ATRIAL RATE: 68 BPM
EKG DIAGNOSIS: NORMAL
EKG DIAGNOSIS: NORMAL
EKG P AXIS: 30 DEGREES
EKG P AXIS: 33 DEGREES
EKG P-R INTERVAL: 160 MS
EKG P-R INTERVAL: 170 MS
EKG Q-T INTERVAL: 386 MS
EKG Q-T INTERVAL: 452 MS
EKG QRS DURATION: 80 MS
EKG QRS DURATION: 94 MS
EKG QTC CALCULATION (BAZETT): 410 MS
EKG QTC CALCULATION (BAZETT): 412 MS
EKG R AXIS: -26 DEGREES
EKG R AXIS: -34 DEGREES
EKG T AXIS: -2 DEGREES
EKG T AXIS: -9 DEGREES
EKG VENTRICULAR RATE: 50 BPM
EKG VENTRICULAR RATE: 68 BPM

## 2021-01-12 PROCEDURE — 93010 ELECTROCARDIOGRAM REPORT: CPT | Performed by: INTERNAL MEDICINE

## 2021-05-11 ENCOUNTER — HOSPITAL ENCOUNTER (EMERGENCY)
Age: 51
Discharge: HOME | End: 2021-05-11
Payer: COMMERCIAL

## 2021-05-11 ENCOUNTER — APPOINTMENT (OUTPATIENT)
Dept: CT IMAGING | Age: 51
End: 2021-05-11
Payer: COMMERCIAL

## 2021-05-11 VITALS
BODY MASS INDEX: 39.17 KG/M2 | RESPIRATION RATE: 16 BRPM | TEMPERATURE: 97.7 F | HEART RATE: 62 BPM | SYSTOLIC BLOOD PRESSURE: 120 MMHG | HEIGHT: 75 IN | WEIGHT: 315 LBS | OXYGEN SATURATION: 95 % | DIASTOLIC BLOOD PRESSURE: 76 MMHG

## 2021-05-11 DIAGNOSIS — R31.9 HEMATURIA, UNSPECIFIED TYPE: ICD-10-CM

## 2021-05-11 DIAGNOSIS — R10.9 LEFT SIDED ABDOMINAL PAIN: Primary | ICD-10-CM

## 2021-05-11 LAB
ALBUMIN SERPL-MCNC: 3.9 GM/DL (ref 3.4–5)
ALP BLD-CCNC: 73 IU/L (ref 40–129)
ALT SERPL-CCNC: 18 U/L (ref 10–40)
ANION GAP SERPL CALCULATED.3IONS-SCNC: 5 MMOL/L (ref 4–16)
AST SERPL-CCNC: 17 IU/L (ref 15–37)
BACTERIA: ABNORMAL /HPF
BASOPHILS ABSOLUTE: 0 K/CU MM
BASOPHILS RELATIVE PERCENT: 0.4 % (ref 0–1)
BILIRUB SERPL-MCNC: 0.5 MG/DL (ref 0–1)
BILIRUBIN URINE: NEGATIVE MG/DL
BLOOD, URINE: ABNORMAL
BUN BLDV-MCNC: 19 MG/DL (ref 6–23)
CALCIUM SERPL-MCNC: 9.5 MG/DL (ref 8.3–10.6)
CHLORIDE BLD-SCNC: 107 MMOL/L (ref 99–110)
CLARITY: CLEAR
CO2: 26 MMOL/L (ref 21–32)
COLOR: YELLOW
CREAT SERPL-MCNC: 1.4 MG/DL (ref 0.9–1.3)
DIFFERENTIAL TYPE: ABNORMAL
EOSINOPHILS ABSOLUTE: 0.1 K/CU MM
EOSINOPHILS RELATIVE PERCENT: 1.5 % (ref 0–3)
GFR AFRICAN AMERICAN: >60 ML/MIN/1.73M2
GFR NON-AFRICAN AMERICAN: 53 ML/MIN/1.73M2
GLUCOSE BLD-MCNC: 112 MG/DL (ref 70–99)
GLUCOSE, URINE: NEGATIVE MG/DL
HCT VFR BLD CALC: 52.1 % (ref 42–52)
HEMOGLOBIN: 16.7 GM/DL (ref 13.5–18)
IMMATURE NEUTROPHIL %: 0.5 % (ref 0–0.43)
KETONES, URINE: NEGATIVE MG/DL
LEUKOCYTE ESTERASE, URINE: NEGATIVE
LIPASE: 55 IU/L (ref 13–60)
LYMPHOCYTES ABSOLUTE: 1.3 K/CU MM
LYMPHOCYTES RELATIVE PERCENT: 15.2 % (ref 24–44)
MCH RBC QN AUTO: 30.2 PG (ref 27–31)
MCHC RBC AUTO-ENTMCNC: 32.1 % (ref 32–36)
MCV RBC AUTO: 94.2 FL (ref 78–100)
MONOCYTES ABSOLUTE: 0.6 K/CU MM
MONOCYTES RELATIVE PERCENT: 6.8 % (ref 0–4)
MUCUS: ABNORMAL HPF
NITRITE URINE, QUANTITATIVE: NEGATIVE
NUCLEATED RBC %: 0 %
PDW BLD-RTO: 13.4 % (ref 11.7–14.9)
PH, URINE: 5 (ref 5–8)
PLATELET # BLD: 200 K/CU MM (ref 140–440)
PMV BLD AUTO: 8.4 FL (ref 7.5–11.1)
POTASSIUM SERPL-SCNC: 4.7 MMOL/L (ref 3.5–5.1)
PROTEIN UA: NEGATIVE MG/DL
RBC # BLD: 5.53 M/CU MM (ref 4.6–6.2)
RBC URINE: 30 /HPF (ref 0–3)
SEGMENTED NEUTROPHILS ABSOLUTE COUNT: 6.3 K/CU MM
SEGMENTED NEUTROPHILS RELATIVE PERCENT: 75.6 % (ref 36–66)
SODIUM BLD-SCNC: 138 MMOL/L (ref 135–145)
SPECIFIC GRAVITY UA: 1.01 (ref 1–1.03)
SQUAMOUS EPITHELIAL: <1 /HPF
TOTAL IMMATURE NEUTOROPHIL: 0.04 K/CU MM
TOTAL NUCLEATED RBC: 0 K/CU MM
TOTAL PROTEIN: 6.6 GM/DL (ref 6.4–8.2)
TRICHOMONAS: ABNORMAL /HPF
UROBILINOGEN, URINE: NEGATIVE MG/DL (ref 0.2–1)
WBC # BLD: 8.3 K/CU MM (ref 4–10.5)
WBC UA: <1 /HPF (ref 0–2)

## 2021-05-11 PROCEDURE — 85025 COMPLETE CBC W/AUTO DIFF WBC: CPT

## 2021-05-11 PROCEDURE — 83690 ASSAY OF LIPASE: CPT

## 2021-05-11 PROCEDURE — 6370000000 HC RX 637 (ALT 250 FOR IP): Performed by: PHYSICIAN ASSISTANT

## 2021-05-11 PROCEDURE — 99284 EMERGENCY DEPT VISIT MOD MDM: CPT

## 2021-05-11 PROCEDURE — 6360000002 HC RX W HCPCS: Performed by: PHYSICIAN ASSISTANT

## 2021-05-11 PROCEDURE — 81001 URINALYSIS AUTO W/SCOPE: CPT

## 2021-05-11 PROCEDURE — 74176 CT ABD & PELVIS W/O CONTRAST: CPT

## 2021-05-11 PROCEDURE — 80053 COMPREHEN METABOLIC PANEL: CPT

## 2021-05-11 PROCEDURE — 96372 THER/PROPH/DIAG INJ SC/IM: CPT

## 2021-05-11 RX ORDER — KETOROLAC TROMETHAMINE 30 MG/ML
30 INJECTION, SOLUTION INTRAMUSCULAR; INTRAVENOUS ONCE
Status: COMPLETED | OUTPATIENT
Start: 2021-05-11 | End: 2021-05-11

## 2021-05-11 RX ORDER — ONDANSETRON 4 MG/1
4 TABLET, ORALLY DISINTEGRATING ORAL EVERY 6 HOURS
Qty: 10 TABLET | Refills: 0 | Status: SHIPPED | OUTPATIENT
Start: 2021-05-11

## 2021-05-11 RX ORDER — DICYCLOMINE HYDROCHLORIDE 10 MG/1
20 CAPSULE ORAL
Qty: 30 CAPSULE | Refills: 0 | Status: SHIPPED | OUTPATIENT
Start: 2021-05-11

## 2021-05-11 RX ORDER — ONDANSETRON 4 MG/1
4 TABLET, ORALLY DISINTEGRATING ORAL ONCE
Status: COMPLETED | OUTPATIENT
Start: 2021-05-11 | End: 2021-05-11

## 2021-05-11 RX ADMIN — ONDANSETRON 4 MG: 4 TABLET, ORALLY DISINTEGRATING ORAL at 11:48

## 2021-05-11 RX ADMIN — KETOROLAC TROMETHAMINE 30 MG: 30 INJECTION, SOLUTION INTRAMUSCULAR; INTRAVENOUS at 11:48

## 2021-05-11 ASSESSMENT — PAIN SCALES - GENERAL
PAINLEVEL_OUTOF10: 2
PAINLEVEL_OUTOF10: 2

## 2021-05-11 NOTE — ED PROVIDER NOTES
eMERGENCY dEPARTMENT eNCOUnter      PCP: Andi Saucedo    CHIEF COMPLAINT    Chief Complaint   Patient presents with    Abdominal Pain       HPI    Santino Nogueira is a 44 y.o. male who presents with abdominal pain. Pain began this morning about 8:30 AM and left mid to lower abdomen. He states pain radiated towards left thigh and left testicle. He had 2 bowel movements, initially thought that improved symptoms, however then pain returned. He states since arriving to the emergency department pain has improved, currently a 2 out of 10. No associated emesis. Denies dysuria or hematuria. States that he has frequent changes in bowel movement habits from mild constipation to looser stools. No recent fevers. He is status post cholecystectomy and appendectomy. He does endorse history of kidney stones that he has been able to pass on his own in the past.  No associated chest pain or shortness of breath. REVIEW OF SYSTEMS    Constitutional:  Denies fever, chills, weight loss or weakness   HENT:  Denies sore throat or ear pain   Cardiovascular:  Denies chest pain, palpitations or swelling   Respiratory:  Denies cough or shortness of breath   GI:  See HPI above  : No hematuria or dysuria. Musculoskeletal:  Denies back pain or groin pain or masses. No pain or swelling of extremities.   Skin:  Denies rash  Neurologic:  Denies headache, focal weakness or sensory changes   Endocrine:  Denies polyuria or polydypsia   Lymphatic:  Denies swollen glands     All other review of systems are negative  See HPI and nursing notes for additional information     PAST MEDICAL & SURGICAL HISTORY    Past Medical History:   Diagnosis Date    CAD (coronary artery disease)     Chronic kidney disease     stones 20 yrs ago    [facility] [address]     Hyperlipidemia     Myocardial infarction ([facility] [address] 1995, 1996, 2009    MI x3     Past Surgical History:   Procedure Laterality Date    APPENDECTOMY  CARDIAC SURGERY      heart caths    CHOLECYSTECTOMY      CORONARY ANGIOPLASTY WITH STENT PLACEMENT      CYST INCISION AND DRAINAGE         CURRENT MEDICATIONS    Current Outpatient Rx   Medication Sig Dispense Refill    dicyclomine (BENTYL) 10 MG capsule Take 2 capsules by mouth 4 times daily (before meals and nightly) 30 capsule 0    ondansetron (ZOFRAN ODT) 4 MG disintegrating tablet Take 1 tablet by mouth every 6 hours 10 tablet 0    nitroGLYCERIN (NITROSTAT) 0.4 MG SL tablet up to max of 3 total doses. If no relief after 1 dose, call 911. 25 tablet 3    aspirin 81 MG chewable tablet Take 1 tablet by mouth daily 30 tablet 3    atorvastatin (LIPITOR) 40 MG tablet Take 1 tablet by mouth nightly 30 tablet 3    clopidogrel (PLAVIX) 75 MG tablet Take 1 tablet by mouth daily 30 tablet 3    nicotine (NICODERM CQ) 14 MG/24HR Place 1 patch onto the skin daily 30 patch 3    losartan (COZAAR) 100 MG tablet Take 1 tablet by mouth daily 30 tablet 3    metoprolol succinate (TOPROL XL) 50 MG extended release tablet Take 1 tablet by mouth daily 30 tablet 3    isosorbide mononitrate (IMDUR) 30 MG CR tablet Take 30 mg by mouth daily.          ALLERGIES    No Known Allergies    SOCIAL AND FAMILY HISTORY    Social History     Socioeconomic History    Marital status:      Spouse name: None    Number of children: None    Years of education: None    Highest education level: None   Occupational History    None   Social Needs    Financial resource strain: None    Food insecurity     Worry: None     Inability: None    Transportation needs     Medical: None     Non-medical: None   Tobacco Use    Smoking status: Current Every Day Smoker     Packs/day: 1.00     Years: 15.00     Pack years: 15.00     Types: Cigarettes    Smokeless tobacco: Former User   Substance and Sexual Activity    Alcohol use: No    Drug use: No    Sexual activity: Yes   Lifestyle    Physical activity     Days per week: None Minutes per session: None    Stress: None   Relationships    Social connections     Talks on phone: None     Gets together: None     Attends [employer] service: None     Active member of club or organization: None     Attends meetings of clubs or organizations: None     Relationship status: None    Intimate partner violence     Fear of current or ex partner: None     Emotionally abused: None     Physically abused: None     Forced sexual activity: None   Other Topics Concern    None   Social History Narrative    None     Family History   Problem Relation Age of Onset    Heart Disease Mother     Arthritis Mother     High Blood Pressure Mother     Heart Disease Father     Diabetes Brother     High Blood Pressure Brother     Mental Illness Brother        PHYSICAL EXAM    VITAL SIGNS: /76   Pulse 62   Temp 97.7 °F (36.5 °C) (Oral)   Resp 16   Ht 6' 3\" (1.905 m)   Wt (!) 340 lb (154.2 kg)   SpO2 95%   BMI 42.50 kg/m²   Constitutional:  Well developed, well nourished. No distress  Eyes:  Sclera nonicteric, conjunctiva moist  HENT:  Atraumatic. PERRL. EOMI. Moist mucus membranes. Neck/Lymphatics: supple, no JVD, no swollen nodes  Respiratory:  No retractions, no accessory muscle use, normal breath sounds   Cardiovascular:   normal rate, normal rhythm, no murmurs    GI:     No gross discoloration. Bowel sounds present, no audible bruits. Soft,  no distention, no guarding, no rigidity,   no abdominal tenderness, no rebound tenderness, no palpable pulsatile masses  Back:   No CVA tenderness to percussion.   Musculoskeletal:  No edema, no deformity  Vascular: DP pulses 2+ equal bilaterally  Integument: No rash, dry skin  Neurologic:  Alert & oriented, normal speech  Psychiatric: Cooperative, pleasant affect       LABS:  Results for orders placed or performed during the hospital encounter of 05/11/21   CBC auto diff   Result Value Ref Range    WBC 8.3 4.0 - 10.5 K/CU MM    RBC 5.53 4.6 - 6.2 M/CU MM Hemoglobin 16.7 13.5 - 18.0 GM/DL    Hematocrit 52.1 (H) 42 - 52 %    MCV 94.2 78 - 100 FL    MCH 30.2 27 - 31 PG    MCHC 32.1 32.0 - 36.0 %    RDW 13.4 11.7 - 14.9 %    Platelets 615 056 - 899 K/CU MM    MPV 8.4 7.5 - 11.1 FL    Differential Type AUTOMATED DIFFERENTIAL     Segs Relative 75.6 (H) 36 - 66 %    Lymphocytes % 15.2 (L) 24 - 44 %    Monocytes % 6.8 (H) 0 - 4 %    Eosinophils % 1.5 0 - 3 %    Basophils % 0.4 0 - 1 %    Segs Absolute 6.3 K/CU MM    Lymphocytes Absolute 1.3 K/CU MM    Monocytes Absolute 0.6 K/CU MM    Eosinophils Absolute 0.1 K/CU MM    Basophils Absolute 0.0 K/CU MM    Nucleated RBC % 0.0 %    Total Nucleated RBC 0.0 K/CU MM    Total Immature Neutrophil 0.04 K/CU MM    Immature Neutrophil % 0.5 (H) 0 - 0.43 %   CMP   Result Value Ref Range    Sodium 138 135 - 145 MMOL/L    Potassium 4.7 3.5 - 5.1 MMOL/L    Chloride 107 99 - 110 mMol/L    CO2 26 21 - 32 MMOL/L    BUN 19 6 - 23 MG/DL    CREATININE 1.4 (H) 0.9 - 1.3 MG/DL    Glucose 112 (H) 70 - 99 MG/DL    Calcium 9.5 8.3 - 10.6 MG/DL    Albumin 3.9 3.4 - 5.0 GM/DL    Total Protein 6.6 6.4 - 8.2 GM/DL    Total Bilirubin 0.5 0.0 - 1.0 MG/DL    ALT 18 10 - 40 U/L    AST 17 15 - 37 IU/L    Alkaline Phosphatase 73 40 - 129 IU/L    GFR Non- 53 (L) >60 mL/min/1.73m2    GFR African American >60 >60 mL/min/1.73m2    Anion Gap 5 4 - 16   Lipase   Result Value Ref Range    Lipase 55 13 - 60 IU/L   Urinalysis   Result Value Ref Range    Color, UA YELLOW YELLOW    Clarity, UA CLEAR CLEAR    Glucose, Urine NEGATIVE NEGATIVE MG/DL    Bilirubin Urine NEGATIVE NEGATIVE MG/DL    Ketones, Urine NEGATIVE NEGATIVE MG/DL    Specific Gravity, UA 1.014 1.001 - 1.035    Blood, Urine LARGE (A) NEGATIVE    pH, Urine 5.0 5.0 - 8.0    Protein, UA NEGATIVE NEGATIVE MG/DL    Urobilinogen, Urine NEGATIVE 0.2 - 1.0 MG/DL    Nitrite Urine, Quantitative NEGATIVE NEGATIVE    Leukocyte Esterase, Urine NEGATIVE NEGATIVE    RBC, UA 30 (H) 0 - 3 /HPF    WBC, UA <1 0 - 2 /HPF    Bacteria, UA RARE (A) NEGATIVE /HPF    Squam Epithel, UA <1 /HPF    Mucus, UA RARE (A) NEGATIVE HPF    Trichomonas, UA NONE SEEN NONE SEEN /HPF           RADIOLOGY/PROCEDURES    CT ABDOMEN PELVIS WO CONTRAST Additional Contrast? None   Final Result   No acute process in the abdomen pelvis. No evidence of hydronephrosis. Punctate 1 mm nonobstructive calculus in the left superior renal pole. Slightly increased size of a 3.5 cm exophytic simple cyst arising from the   left kidney. No further imaging follow-up is required. ED COURSE & MEDICAL DECISION MAKING       Vital signs and nursing notes reviewed during ED course. I have independently evaluated this patient . Supervising MD present in the Emergency Department, available for consultation, throughout entirety of  patient care. Patient presents with intermittent left-sided abdominal pain since this morning. He is hypertensive on arrival, blood pressure 153/119, afebrile, not tachycardic, oxygenating well on room air. He is overall very comfortable on my evaluation without significant tenderness to palpation of abdomen or CVA. He is given Toradol, Zofran. Will obtain labs, urinalysis, CT imaging for further evaluation. Lab work with creatinine at 1.4, on chart review, has been elevated similarly in the past.  Normal BUN. Urinalysis with rare bacteria, less than 1 white blood cell, 30 red blood cells noted. CT abdomen and pelvis without acute process. No evidence of hydronephrosis. There is a punctate 1 mm nonobstructing calculus in the left superior renal pole. Slightly increased size of 3.5 cm exophytic simple cyst arising from the left kidney with no further imaging required. Patient resting comfortably on subsequent evaluation, has had no return of significant pain throughout ED stay. At this time, unclear etiology of patient's symptoms.   Discussed possibility of recently passed stone with hematuria in urine. Also discussed possibility of an early presentation of emergent process and will need close outpatient follow-up. We discussed follow-up with primary care in the next day for recheck and he is also given neurology follow-up to further evaluate hematuria noted today. We will plan on discharging with symptomatic medications and having him immediately return here with any new or worsening symptoms. He is agreeable with this plan, comfortable with discharge. Blood pressure is improved on reevaluation, he remains hemodynamically stable, afebrile throughout ED stay. Clinical  IMPRESSION    1. Left sided abdominal pain    2. Hematuria, unspecified type        Disposition referral (if applicable): [facility]  [address]  [place]  [phone]    Schedule an appointment as soon as possible for a visit in 1 day  For recheck of symptoms treated for today    [address] [facility] [address] [address]  [phone]    Schedule an appointment as soon as possible for a visit   for urology follow up for hematuria    [facility] Emergency Department  [place] [id number] [phone]  Go to   As needed, If symptoms worsen      Disposition medications (if applicable):  New Prescriptions    DICYCLOMINE (BENTYL) 10 MG CAPSULE    Take 2 capsules by mouth 4 times daily (before meals and nightly)    ONDANSETRON (ZOFRAN ODT) 4 MG DISINTEGRATING TABLET    Take 1 tablet by mouth every 6 hours         Comment: Please note this report has been produced using speech recognition software and may contain errors related to that system including errors in grammar, punctuation, and spelling, as well as words and phrases that may be inappropriate. If there are any questions or concerns please feel free to contact the dictating provider for clarification.         DEO Townsend  05/11/21 [id number]

## 2021-07-08 ENCOUNTER — HOSPITAL ENCOUNTER (EMERGENCY)
Age: 51
Discharge: HOME | End: 2021-07-08

## 2021-07-08 VITALS
RESPIRATION RATE: 18 BRPM | HEIGHT: 75 IN | OXYGEN SATURATION: 98 % | BODY MASS INDEX: 39.17 KG/M2 | TEMPERATURE: 98.5 F | SYSTOLIC BLOOD PRESSURE: 132 MMHG | WEIGHT: 315 LBS | HEART RATE: 65 BPM | DIASTOLIC BLOOD PRESSURE: 80 MMHG

## 2021-07-08 DIAGNOSIS — K64.4 EXTERNAL HEMORRHOID, BLEEDING: Primary | ICD-10-CM

## 2021-07-08 LAB
ANION GAP SERPL CALCULATED.3IONS-SCNC: 8 MMOL/L (ref 4–16)
BASOPHILS ABSOLUTE: 0 K/CU MM
BASOPHILS RELATIVE PERCENT: 0.4 % (ref 0–1)
BUN BLDV-MCNC: 14 MG/DL (ref 6–23)
CALCIUM SERPL-MCNC: 9.1 MG/DL (ref 8.3–10.6)
CHLORIDE BLD-SCNC: 105 MMOL/L (ref 99–110)
CO2: 27 MMOL/L (ref 21–32)
CREAT SERPL-MCNC: 1.2 MG/DL (ref 0.9–1.3)
DIFFERENTIAL TYPE: ABNORMAL
EOSINOPHILS ABSOLUTE: 0.2 K/CU MM
EOSINOPHILS RELATIVE PERCENT: 2.3 % (ref 0–3)
GFR AFRICAN AMERICAN: >60 ML/MIN/1.73M2
GFR NON-AFRICAN AMERICAN: >60 ML/MIN/1.73M2
GLUCOSE BLD-MCNC: 80 MG/DL (ref 70–99)
HCT VFR BLD CALC: 53.9 % (ref 42–52)
HEMOGLOBIN: 17 GM/DL (ref 13.5–18)
IMMATURE NEUTROPHIL %: 0.3 % (ref 0–0.43)
LYMPHOCYTES ABSOLUTE: 2 K/CU MM
LYMPHOCYTES RELATIVE PERCENT: 26.2 % (ref 24–44)
MCH RBC QN AUTO: 29.5 PG (ref 27–31)
MCHC RBC AUTO-ENTMCNC: 31.5 % (ref 32–36)
MCV RBC AUTO: 93.4 FL (ref 78–100)
MONOCYTES ABSOLUTE: 0.5 K/CU MM
MONOCYTES RELATIVE PERCENT: 6.6 % (ref 0–4)
NUCLEATED RBC %: 0 %
PDW BLD-RTO: 13.5 % (ref 11.7–14.9)
PLATELET # BLD: 189 K/CU MM (ref 140–440)
PMV BLD AUTO: 8.2 FL (ref 7.5–11.1)
POTASSIUM SERPL-SCNC: 4.6 MMOL/L (ref 3.5–5.1)
RBC # BLD: 5.77 M/CU MM (ref 4.6–6.2)
SEGMENTED NEUTROPHILS ABSOLUTE COUNT: 4.8 K/CU MM
SEGMENTED NEUTROPHILS RELATIVE PERCENT: 64.2 % (ref 36–66)
SODIUM BLD-SCNC: 140 MMOL/L (ref 135–145)
TOTAL IMMATURE NEUTOROPHIL: 0.02 K/CU MM
TOTAL NUCLEATED RBC: 0 K/CU MM
WBC # BLD: 7.5 K/CU MM (ref 4–10.5)

## 2021-07-08 PROCEDURE — 99284 EMERGENCY DEPT VISIT MOD MDM: CPT

## 2021-07-08 PROCEDURE — 85025 COMPLETE CBC W/AUTO DIFF WBC: CPT

## 2021-07-08 PROCEDURE — 36415 COLL VENOUS BLD VENIPUNCTURE: CPT

## 2021-07-08 PROCEDURE — 80048 BASIC METABOLIC PNL TOTAL CA: CPT

## 2021-07-08 PROCEDURE — 6370000000 HC RX 637 (ALT 250 FOR IP): Performed by: NURSE PRACTITIONER

## 2021-07-08 RX ORDER — HYDROCORTISONE 25 MG/G
CREAM TOPICAL 2 TIMES DAILY
Status: DISCONTINUED | OUTPATIENT
Start: 2021-07-08 | End: 2021-07-08 | Stop reason: HOSPADM

## 2021-07-08 RX ORDER — ACETAMINOPHEN 325 MG/1
650 TABLET ORAL ONCE
Status: COMPLETED | OUTPATIENT
Start: 2021-07-08 | End: 2021-07-08

## 2021-07-08 RX ORDER — HYDROCODONE BITARTRATE AND ACETAMINOPHEN 5; 325 MG/1; MG/1
1 TABLET ORAL EVERY 6 HOURS PRN
Qty: 10 TABLET | Refills: 0 | Status: SHIPPED | OUTPATIENT
Start: 2021-07-08 | End: 2021-07-11

## 2021-07-08 RX ADMIN — ACETAMINOPHEN 650 MG: 325 TABLET ORAL at 13:09

## 2021-07-08 RX ADMIN — HYDROCORTISONE 2.5%: 25 CREAM TOPICAL at 14:23

## 2021-07-08 ASSESSMENT — PAIN SCALES - GENERAL
PAINLEVEL_OUTOF10: 6
PAINLEVEL_OUTOF10: 10
PAINLEVEL_OUTOF10: 5

## 2021-07-08 ASSESSMENT — PAIN DESCRIPTION - LOCATION: LOCATION: RECTUM

## 2021-07-08 NOTE — ED PROVIDER NOTES
EMERGENCY DEPARTMENT ENCOUNTER      PCP: Becky Gonzalez    Chief Complaint   Patient presents with    Hemorrhoids     had a very hard stool and irritated hemorrhoids with blood when passing stool, pain unbearable         HPI    Yousif Hurt is a 44 y.o. male who presents with of painful hemorrhoids. The patient states he was constipated and impacted days ago. He states he disimpacted himself and had a very large bowel movement. He states he initially passed very hard stool. Since that time, he has had increased pain, burning, and itching in the rectum. States typically he uses talks and Epsom salt baths with relief however this time it is not working. He rates his pain anywhere between forward 8/10, burning and sharp, and constant. Ambulation and sitting exacerbate his symptoms, nothing has alleviated his symptoms. He states he does take Plavix. He has had some bright red blood on his stool. Is any abdominal pain, dizziness, fevers, or other complaints. REVIEW OF SYSTEMS    Constitutional:  Denies fever, chills, weight loss or weakness   HENT:  Denies sore throat or ear pain   Cardiovascular:  Denies chest pain, palpitations   Respiratory:  Denies cough or shortness of breath    GI:  Denies abdominal pain, nausea, vomiting, or diarrhea  :  See HPI.    Musculoskeletal:  Denies back pain  Skin:  Denies rash  Neurologic:  Denies headache, focal weakness or sensory changes   Endocrine:  Denies polyuria or polydypsia   Lymphatic:  Denies swollen glands     All other review of systems are negative  See HPI and nursing notes for additional information     PAST MEDICAL AND SURGICAL HISTORY    Past Medical History:   Diagnosis Date    CAD (coronary artery disease)     Chronic kidney disease     stones 20 yrs ago    [facility]) [facility] [address]     Hyperlipidemia     Myocardial infarction ([facility] [address] 1995, 1996, 2009    MI x3     Past Surgical History:   Procedure Laterality Date    APPENDECTOMY      CARDIAC SURGERY      heart caths    CHOLECYSTECTOMY      CORONARY ANGIOPLASTY WITH STENT PLACEMENT      CYST INCISION AND DRAINAGE         CURRENT MEDICATIONS    Current Outpatient Rx   Medication Sig Dispense Refill    HYDROcodone-acetaminophen (NORCO) 5-325 MG per tablet Take 1 tablet by mouth every 6 hours as needed for Pain for up to 3 days. Intended supply: 3 days. Take lowest dose possible to manage pain 10 tablet 0    dicyclomine (BENTYL) 10 MG capsule Take 2 capsules by mouth 4 times daily (before meals and nightly) 30 capsule 0    ondansetron (ZOFRAN ODT) 4 MG disintegrating tablet Take 1 tablet by mouth every 6 hours 10 tablet 0    nitroGLYCERIN (NITROSTAT) 0.4 MG SL tablet up to max of 3 total doses. If no relief after 1 dose, call 911. 25 tablet 3    aspirin 81 MG chewable tablet Take 1 tablet by mouth daily 30 tablet 3    atorvastatin (LIPITOR) 40 MG tablet Take 1 tablet by mouth nightly 30 tablet 3    clopidogrel (PLAVIX) 75 MG tablet Take 1 tablet by mouth daily 30 tablet 3    nicotine (NICODERM CQ) 14 MG/24HR Place 1 patch onto the skin daily 30 patch 3    losartan (COZAAR) 100 MG tablet Take 1 tablet by mouth daily 30 tablet 3    metoprolol succinate (TOPROL XL) 50 MG extended release tablet Take 1 tablet by mouth daily 30 tablet 3    isosorbide mononitrate (IMDUR) 30 MG CR tablet Take 30 mg by mouth daily.          ALLERGIES    No Known Allergies    SOCIAL AND FAMILY HISTORY    Social History     Socioeconomic History    Marital status:      Spouse name: None    Number of children: None    Years of education: None    Highest education level: None   Occupational History    None   Tobacco Use    Smoking status: Current Every Day Smoker     Packs/day: 1.00     Years: 15.00     Pack years: 15.00     Types: Cigarettes    Smokeless tobacco: Former User   Vaping Use    Vaping Use: Never assessed   Substance and Sexual Activity    Alcohol use: No    Drug use: No    Sexual activity: Yes   Other Topics Concern    None   Social History Narrative    None     Social Determinants of Health     Financial Resource Strain:     Difficulty of Paying Living Expenses:    Food Insecurity:     Worried About Running Out of Food in the Last Year:     [address] in the Last Year:    Transportation Needs:     Lack of Transportation (Medical):  Lack of Transportation (Non-Medical):    Physical Activity:     Days of Exercise per Week:     Minutes of Exercise per Session:    Stress:     Feeling of Stress :    Social Connections:     Frequency of Communication with Friends and Family:     Frequency of Social Gatherings with Friends and Family:     Attends [employer] Services:     Active Member of Clubs or Organizations:     Attends Club or Organization Meetings:     Marital Status:    Intimate Partner Violence:     Fear of Current or Ex-Partner:     Emotionally Abused:     Physically Abused:     Sexually Abused:      Family History   Problem Relation Age of Onset    Heart Disease Mother     Arthritis Mother     High Blood Pressure Mother     Heart Disease Father     Diabetes Brother     High Blood Pressure Brother     Mental Illness Brother          PHYSICAL EXAM    VITAL SIGNS: /80   Pulse 65   Temp 98.5 °F (36.9 °C) (Oral)   Resp 18   Ht 6' 3\" (1.905 m)   Wt (!) 330 lb (149.7 kg)   SpO2 98%   BMI 41.25 kg/m²    Constitutional:  Well developed, Well nourished. No distress  HENT:  Normocephalic, Atraumatic, PERRL. EOMI. Sclera clear. Conjunctiva normal, No discharge. Neck/Lymphatics: supple, no JVD, no swollen nodes  Cardiovascular:   RRR,  no murmurs/rubs/gallops. No JVD  Respiratory:  Nonlabored breathing. Normal breath sounds, No wheezing  Abdomen: Bowel sounds normal, Soft, No tenderness, no masses.   Rectal: There is a small external hemorrhoid that is thrombosed less than 0.5 cm at the 9 o'clock position, Tender to palpation. Musculoskeletal:    There is no edema, asymmetry, or calf / thigh tenderness bilaterally. No cyanosis. Bilateral upper and lower extremity ROM intact without pain or obvious deficit  Integument:  Warm, Dry  Neurologic: Alert & oriented , No focal deficits noted. Cranial nerves II through XII grossly intact. Normal gross motor coordination & motor strength bilateral upper and lower extremities  Psychiatric:  Affect normal, Mood normal.       Labs:  Results for orders placed or performed during the hospital encounter of 07/08/21   CBC auto diff   Result Value Ref Range    WBC 7.5 4.0 - 10.5 K/CU MM    RBC 5.77 4.6 - 6.2 M/CU MM    Hemoglobin 17.0 13.5 - 18.0 GM/DL    Hematocrit 53.9 (H) 42 - 52 %    MCV 93.4 78 - 100 FL    MCH 29.5 27 - 31 PG    MCHC 31.5 (L) 32.0 - 36.0 %    RDW 13.5 11.7 - 14.9 %    Platelets 409 397 - 302 K/CU MM    MPV 8.2 7.5 - 11.1 FL    Differential Type AUTOMATED DIFFERENTIAL     Segs Relative 64.2 36 - 66 %    Lymphocytes % 26.2 24 - 44 %    Monocytes % 6.6 (H) 0 - 4 %    Eosinophils % 2.3 0 - 3 %    Basophils % 0.4 0 - 1 %    Segs Absolute 4.8 K/CU MM    Lymphocytes Absolute 2.0 K/CU MM    Monocytes Absolute 0.5 K/CU MM    Eosinophils Absolute 0.2 K/CU MM    Basophils Absolute 0.0 K/CU MM    Nucleated RBC % 0.0 %    Total Nucleated RBC 0.0 K/CU MM    Total Immature Neutrophil 0.02 K/CU MM    Immature Neutrophil % 0.3 0 - 0.43 %   BMP   Result Value Ref Range    Sodium 140 135 - 145 MMOL/L    Potassium 4.6 3.5 - 5.1 MMOL/L    Chloride 105 99 - 110 mMol/L    CO2 27 21 - 32 MMOL/L    Anion Gap 8 4 - 16    BUN 14 6 - 23 MG/DL    CREATININE 1.2 0.9 - 1.3 MG/DL    Glucose 80 70 - 99 MG/DL    Calcium 9.1 8.3 - 10.6 MG/DL    GFR Non-African American >60 >60 mL/min/1.73m2    GFR African American >60 >60 mL/min/1.73m2             ED COURSE & MEDICAL DECISION MAKING       66-year-old male presents emergency department with complaints of hemorrhoid pain.   The patient has known hemorrhoids and had increased pain after having constipation with impaction. He was able to disimpact himself and had a very large bowel movement and been moving his bowels daily since that time. CBC did not show any signs of anemia or leukocytosis. MP did not show any severe electrolyte derangement. The patient does have a small less than 0.5 cm thrombosed hemorrhoid. He was instructed to do warm sitz bath's, given a prescription for Anusol, and a small prescription for Norco.  He was instructed to follow-up with general surgery and to call today to schedule an appointment. He was instructed to return here immediately with any worsening symptoms. Usual narcotic precautions given. The patient verbalized an understanding and agrees with plan of care. Patient agrees to return emergency department if symptoms worsen or any new symptoms develop. Vital signs and nursing notes reviewed during ED course. Differentials include but are not limited to: Hemorrhage, oncological process, constipation, anal fissure    Clinical  IMPRESSION    1. External hemorrhoid, bleeding              Comment: Please note this report has been produced using speech recognition software and may contain errors related to that system including errors in grammar, punctuation, and spelling, as well as words and phrases that may be inappropriate. If there are any questions or concerns please feel free to contact the dictating provider for clarification.       OMAR Meza - CNP  07/08/21 [id number]

## 2021-07-08 NOTE — ED TRIAGE NOTES
Pt states 2 days ago he had a very hard and large stool and that irritated his hemorrhoids. Pt continues to have with blood when passing stool only, pain unbearable despite using sitz bath with epsom salt, preparation H, and tylenol. Pt is unable to sit down.

## 2021-08-23 ENCOUNTER — HOSPITAL ENCOUNTER (OUTPATIENT)
Dept: LAB | Age: 51
Discharge: HOME | End: 2021-08-23

## 2021-08-23 LAB
ANION GAP SERPL CALCULATED.3IONS-SCNC: 9 MMOL/L (ref 4–16)
APTT: 28 SECONDS (ref 25.1–37.1)
BASOPHILS ABSOLUTE: 0 K/CU MM
BASOPHILS RELATIVE PERCENT: 0.5 % (ref 0–1)
BUN BLDV-MCNC: 15 MG/DL (ref 6–23)
CALCIUM SERPL-MCNC: 9.4 MG/DL (ref 8.3–10.6)
CHLORIDE BLD-SCNC: 104 MMOL/L (ref 99–110)
CO2: 25 MMOL/L (ref 21–32)
CREAT SERPL-MCNC: 1.2 MG/DL (ref 0.9–1.3)
DIFFERENTIAL TYPE: ABNORMAL
EOSINOPHILS ABSOLUTE: 0.2 K/CU MM
EOSINOPHILS RELATIVE PERCENT: 3 % (ref 0–3)
GFR AFRICAN AMERICAN: >60 ML/MIN/1.73M2
GFR NON-AFRICAN AMERICAN: >60 ML/MIN/1.73M2
GLUCOSE BLD-MCNC: 100 MG/DL (ref 70–99)
HCT VFR BLD CALC: 51.8 % (ref 42–52)
HEMOGLOBIN: 16.6 GM/DL (ref 13.5–18)
IMMATURE NEUTROPHIL %: 0.5 % (ref 0–0.43)
INR BLD: 0.96 INDEX
LYMPHOCYTES ABSOLUTE: 1.7 K/CU MM
LYMPHOCYTES RELATIVE PERCENT: 27.5 % (ref 24–44)
MCH RBC QN AUTO: 29.4 PG (ref 27–31)
MCHC RBC AUTO-ENTMCNC: 32 % (ref 32–36)
MCV RBC AUTO: 91.7 FL (ref 78–100)
MONOCYTES ABSOLUTE: 0.4 K/CU MM
MONOCYTES RELATIVE PERCENT: 7.3 % (ref 0–4)
NUCLEATED RBC %: 0 %
PDW BLD-RTO: 13.1 % (ref 11.7–14.9)
PLATELET # BLD: 196 K/CU MM (ref 140–440)
PMV BLD AUTO: 8.7 FL (ref 7.5–11.1)
POTASSIUM SERPL-SCNC: 4.7 MMOL/L (ref 3.5–5.1)
PROTHROMBIN TIME: 12.4 SECONDS (ref 11.7–14.5)
RBC # BLD: 5.65 M/CU MM (ref 4.6–6.2)
SARS-COV-2: NOT DETECTED
SEGMENTED NEUTROPHILS ABSOLUTE COUNT: 3.7 K/CU MM
SEGMENTED NEUTROPHILS RELATIVE PERCENT: 61.2 % (ref 36–66)
SODIUM BLD-SCNC: 138 MMOL/L (ref 135–145)
SOURCE: NORMAL
TOTAL IMMATURE NEUTOROPHIL: 0.03 K/CU MM
TOTAL NUCLEATED RBC: 0 K/CU MM
WBC # BLD: 6 K/CU MM (ref 4–10.5)

## 2021-08-23 PROCEDURE — 85025 COMPLETE CBC W/AUTO DIFF WBC: CPT

## 2021-08-23 PROCEDURE — 36415 COLL VENOUS BLD VENIPUNCTURE: CPT

## 2021-08-23 PROCEDURE — 85730 THROMBOPLASTIN TIME PARTIAL: CPT

## 2021-08-23 PROCEDURE — U0003 INFECTIOUS AGENT DETECTION BY NUCLEIC ACID (DNA OR RNA); SEVERE ACUTE RESPIRATORY SYNDROME CORONAVIRUS 2 (SARS-COV-2) (CORONAVIRUS DISEASE [COVID-19]), AMPLIFIED PROBE TECHNIQUE, MAKING USE OF HIGH THROUGHPUT TECHNOLOGIES AS DESCRIBED BY CMS-2020-01-R: HCPCS

## 2021-08-23 PROCEDURE — U0005 INFEC AGEN DETEC AMPLI PROBE: HCPCS

## 2021-08-23 PROCEDURE — 80048 BASIC METABOLIC PNL TOTAL CA: CPT

## 2021-08-23 PROCEDURE — 85610 PROTHROMBIN TIME: CPT

## 2021-08-25 ENCOUNTER — HOSPITAL ENCOUNTER (OUTPATIENT)
Dept: CARDIAC CATH/INVASIVE PROCEDURES | Age: 51
Discharge: HOME | End: 2021-08-25
Attending: INTERNAL MEDICINE | Admitting: INTERNAL MEDICINE

## 2021-08-25 VITALS
OXYGEN SATURATION: 96 % | SYSTOLIC BLOOD PRESSURE: 116 MMHG | WEIGHT: 315 LBS | HEIGHT: 75 IN | HEART RATE: 44 BPM | BODY MASS INDEX: 39.17 KG/M2 | TEMPERATURE: 95.5 F | RESPIRATION RATE: 13 BRPM | DIASTOLIC BLOOD PRESSURE: 81 MMHG

## 2021-08-25 PROBLEM — Z98.61 S/P PTCA (PERCUTANEOUS TRANSLUMINAL CORONARY ANGIOPLASTY): Status: ACTIVE | Noted: 2021-08-25

## 2021-08-25 LAB
ACTIVATED CLOTTING TIME, LOW RANGE: 247 SEC
ACTIVATED CLOTTING TIME, LOW RANGE: 297 SEC

## 2021-08-25 PROCEDURE — C1887 CATHETER, GUIDING: HCPCS

## 2021-08-25 PROCEDURE — 92928 PRQ TCAT PLMT NTRAC ST 1 LES: CPT

## 2021-08-25 PROCEDURE — 6370000000 HC RX 637 (ALT 250 FOR IP): Performed by: INTERNAL MEDICINE

## 2021-08-25 PROCEDURE — C1874 STENT, COATED/COV W/DEL SYS: HCPCS

## 2021-08-25 PROCEDURE — 93458 L HRT ARTERY/VENTRICLE ANGIO: CPT

## 2021-08-25 PROCEDURE — 6360000004 HC RX CONTRAST MEDICATION

## 2021-08-25 PROCEDURE — 85347 COAGULATION TIME ACTIVATED: CPT

## 2021-08-25 PROCEDURE — 6370000000 HC RX 637 (ALT 250 FOR IP)

## 2021-08-25 PROCEDURE — C1769 GUIDE WIRE: HCPCS

## 2021-08-25 PROCEDURE — 2580000003 HC RX 258: Performed by: INTERNAL MEDICINE

## 2021-08-25 PROCEDURE — 6360000002 HC RX W HCPCS

## 2021-08-25 PROCEDURE — 2709999900 HC NON-CHARGEABLE SUPPLY

## 2021-08-25 PROCEDURE — C1725 CATH, TRANSLUMIN NON-LASER: HCPCS

## 2021-08-25 RX ORDER — LOSARTAN POTASSIUM 100 MG/1
100 TABLET ORAL DAILY
Status: DISCONTINUED | OUTPATIENT
Start: 2021-08-25 | End: 2021-08-25 | Stop reason: HOSPADM

## 2021-08-25 RX ORDER — SODIUM CHLORIDE 9 MG/ML
25 INJECTION, SOLUTION INTRAVENOUS PRN
Status: DISCONTINUED | OUTPATIENT
Start: 2021-08-25 | End: 2021-08-25 | Stop reason: HOSPADM

## 2021-08-25 RX ORDER — CLOPIDOGREL BISULFATE 75 MG/1
75 TABLET ORAL DAILY
Status: DISCONTINUED | OUTPATIENT
Start: 2021-08-25 | End: 2021-08-25 | Stop reason: SDUPTHER

## 2021-08-25 RX ORDER — ATROPINE SULFATE 0.4 MG/ML
0.5 AMPUL (ML) INJECTION
Status: DISCONTINUED | OUTPATIENT
Start: 2021-08-25 | End: 2021-08-25 | Stop reason: HOSPADM

## 2021-08-25 RX ORDER — SODIUM CHLORIDE 0.9 % (FLUSH) 0.9 %
5-40 SYRINGE (ML) INJECTION PRN
Status: DISCONTINUED | OUTPATIENT
Start: 2021-08-25 | End: 2021-08-25 | Stop reason: HOSPADM

## 2021-08-25 RX ORDER — METOPROLOL SUCCINATE 50 MG/1
50 TABLET, EXTENDED RELEASE ORAL DAILY
Status: DISCONTINUED | OUTPATIENT
Start: 2021-08-25 | End: 2021-08-25 | Stop reason: HOSPADM

## 2021-08-25 RX ORDER — DICYCLOMINE HYDROCHLORIDE 10 MG/1
20 CAPSULE ORAL
Status: DISCONTINUED | OUTPATIENT
Start: 2021-08-25 | End: 2021-08-25 | Stop reason: HOSPADM

## 2021-08-25 RX ORDER — SODIUM CHLORIDE 0.9 % (FLUSH) 0.9 %
5-40 SYRINGE (ML) INJECTION EVERY 12 HOURS SCHEDULED
Status: DISCONTINUED | OUTPATIENT
Start: 2021-08-25 | End: 2021-08-25 | Stop reason: HOSPADM

## 2021-08-25 RX ORDER — SODIUM CHLORIDE 9 MG/ML
INJECTION, SOLUTION INTRAVENOUS CONTINUOUS
Status: DISCONTINUED | OUTPATIENT
Start: 2021-08-25 | End: 2021-08-25 | Stop reason: HOSPADM

## 2021-08-25 RX ORDER — DIPHENHYDRAMINE HCL 25 MG
25 TABLET ORAL ONCE
Status: COMPLETED | OUTPATIENT
Start: 2021-08-25 | End: 2021-08-25

## 2021-08-25 RX ORDER — MORPHINE SULFATE 2 MG/ML
1 INJECTION, SOLUTION INTRAMUSCULAR; INTRAVENOUS
Status: DISCONTINUED | OUTPATIENT
Start: 2021-08-25 | End: 2021-08-25 | Stop reason: HOSPADM

## 2021-08-25 RX ORDER — ASPIRIN 81 MG/1
81 TABLET, CHEWABLE ORAL DAILY
Status: DISCONTINUED | OUTPATIENT
Start: 2021-08-25 | End: 2021-08-25 | Stop reason: SDUPTHER

## 2021-08-25 RX ORDER — ACETAMINOPHEN 325 MG/1
650 TABLET ORAL EVERY 4 HOURS PRN
Status: DISCONTINUED | OUTPATIENT
Start: 2021-08-25 | End: 2021-08-25 | Stop reason: HOSPADM

## 2021-08-25 RX ORDER — ASPIRIN 81 MG/1
81 TABLET, CHEWABLE ORAL DAILY
Status: DISCONTINUED | OUTPATIENT
Start: 2021-08-26 | End: 2021-08-25 | Stop reason: HOSPADM

## 2021-08-25 RX ORDER — ONDANSETRON 4 MG/1
4 TABLET, ORALLY DISINTEGRATING ORAL EVERY 6 HOURS
Status: DISCONTINUED | OUTPATIENT
Start: 2021-08-25 | End: 2021-08-25 | Stop reason: HOSPADM

## 2021-08-25 RX ORDER — CLOPIDOGREL BISULFATE 75 MG/1
75 TABLET ORAL DAILY
Status: DISCONTINUED | OUTPATIENT
Start: 2021-08-26 | End: 2021-08-25 | Stop reason: HOSPADM

## 2021-08-25 RX ORDER — DIAZEPAM 5 MG/1
5 TABLET ORAL ONCE
Status: COMPLETED | OUTPATIENT
Start: 2021-08-25 | End: 2021-08-25

## 2021-08-25 RX ORDER — ATORVASTATIN CALCIUM 40 MG/1
40 TABLET, FILM COATED ORAL NIGHTLY
Status: DISCONTINUED | OUTPATIENT
Start: 2021-08-25 | End: 2021-08-25 | Stop reason: HOSPADM

## 2021-08-25 RX ORDER — NICOTINE 21 MG/24HR
1 PATCH, TRANSDERMAL 24 HOURS TRANSDERMAL DAILY
Status: DISCONTINUED | OUTPATIENT
Start: 2021-08-25 | End: 2021-08-25 | Stop reason: HOSPADM

## 2021-08-25 RX ADMIN — DIPHENHYDRAMINE HYDROCHLORIDE 25 MG: 25 TABLET ORAL at 07:35

## 2021-08-25 RX ADMIN — DIAZEPAM 5 MG: 5 TABLET ORAL at 07:35

## 2021-08-25 RX ADMIN — SODIUM CHLORIDE: 9 INJECTION, SOLUTION INTRAVENOUS at 07:34

## 2021-08-25 NOTE — PROGRESS NOTES
Discharge instructions reviewed with patient. Voices understanding. Ambulated without difficulty. IV removed. Wife coming to  patient for discharge. Patient given his stent card and stent booklet. Right groin with no bleeding nor hematoma noted.

## 2021-08-25 NOTE — OP NOTE
Operative Note      Patient: Soha Suarez  YOB: 1970  MRN: [id number]    Date of Procedure: 8/25/21    Pre-Op Diagnosis: CAD/chest pain     Post-Op Diagnosis: Same      Estimated Blood Loss (mL): less than 50     Complications: None      Electronically signed by Shaka De Leon MD on 8/25/2021 at 10:06 AM    [facility] -[phone]  LEFT MAIN PATENT  LAD MID STENT PATENT   RAMUS MILD DX  LCX MILD DX  RCA MID STENT PATENT, DISTAL 90% TO 0% KAREN XIENCE 4.0X23MM STENT  LVEDP 20  ASA/PLAVIX AND HEPARIN  RIGHT RADIAL  GUIDE LINER USED  NO COMPLICATIONS  HOME TOMORROW  NEED TO QUIT SMOKING

## 2021-08-25 NOTE — H&P
[address] [place] [address], [address] [place] [address]                              HISTORY AND PHYSICAL    PATIENT NAME: Pamula Claude                      :        1970  MED REC NO:   [id number]                          ROOM:  ACCOUNT NO:   [de-identified]                           ADMIT DATE: 2021  PROVIDER:     Smita Ernst MD    INDICATION:  Chest pain. HISTORY OF PRESENT ILLNESS:  This is a 57-year-old male patient who is  known to have coronary artery disease and having significant chest pain  and shortness of breath present. The patient had a heart  catheterization done in last year, 2020. The left main was patent. LAD proximal 90% stenosis which was stented with 3.5 x 18 mm stent. Ramus had mild disease. Circ had mild disease. RCA mid 90% stenosis,  which was stented before with 4-mm stent present. Then underwent a  stress test this year in January. Stress test was negative for  ischemia. LV function was preserved.  _____ have chest pain. Therefore, the patient is here for heart catheterization. Also there is  chest pressure, heaviness present. PAST MEDICAL HISTORY:  History of coronary artery disease, status post  angioplasty done of the LAD and RCA in 2020. Hypertension,  hyperlipidemia and history of venous insufficiency present. PAST SURGICAL HISTORY:  Multiple PCIs done of the LAD and RCA,  gallbladder surgery, I and D and appendectomy. SOCIAL HISTORY:  Does not smoke, does not drink. He works as a truck  . FAMILY HISTORY:  Significant for coronary artery disease present. ALLERGIES:  NKDA. MEDICATIONS:  See the list.    PHYSICAL EXAMINATION:  GENERAL:  The patient is awake, alert, and answering questions, not in  acute distress. VITAL SIGNS:  Temperature is afebrile, pulse is 75, blood pressure  135/80. HEENT:  Head is normocephalic and atraumatic.   Pupils are equal and  reactive to light.  CHEST:  Equal expansion. LUNGS:  Clear to auscultation. No wheezing or rhonchi. HEART:  Regular rate and rhythm. ABDOMEN:  Soft and nontender. Bowel sounds are present. No  hepatosplenomegaly or guarding appreciated. EXTREMITIES:  No cyanosis or clubbing noted. NEUROLOGIC:  Cranial nerves II through XII grossly intact. LABORATORY DATA:  BUN is 15, creatinine 1.2. CBC is normal.    IMPRESSION:  This is a 64-year-old male patient who has a known history  of coronary artery disease present, status post angioplasty done one  year ago. He had a stress test about 6 months ago. Ongoing symptoms. Therefore, the patient is here for heart cath today. PLAN:  We will make further recommendations based on heart cath.         Siria Fu MD    D: 08/25/2021 7:29:37       T: 08/25/2021 7:31:54     [email or web address]  Job#: [id number]     Doc#: [id number]    CC:

## 2021-08-25 NOTE — PROCEDURES
[address] [place] [address], [facility]                            CARDIAC CATHETERIZATION    PATIENT NAME: Rosa Sneed                      :        1970  MED REC NO:   [id number]                          ROOM:  ACCOUNT NO:   [de-identified]                           ADMIT DATE: 2021  PROVIDER:     Tyson Adams MD    DATE OF PROCEDURE:  2021    INDICATION:  Unstable angina. Coronary artery disease. This is a 53-year-old male patient, brought to cath lab today. Informed  consent was obtained from the patient. The patient was prepped and  draped in the usual sterile fashion. The patient was injected with 5 mL  of 2% lidocaine in the right radial region. Using a radial needle,  right radial artery was cannulized, and a 5/6-E[place]ian sheath was placed  in the right radial artery. Entire procedure was done using a  guidewire, and sheath was flushed in between the procedure. Using a TIG catheter, right coronary angiography was performed. Right  coronary angiogram revealed the right coronary artery and the mid stent  is widely patent. Distal to the stent there is 90% stenosis noted. This is a calcified vessel. Gives off the large PD and the PL branch. Then, using a TIG catheter, left coronary angiography was performed. Left coronary angiogram revealed the left main is patent. It bifurcates  into LAD and circumflex artery. Circ is a small-sized vessel, it is  patent. Ramus is a medium-sized vessel, it is patent. LAD has a mid  stent present and the stent is widely patent. Slight ectasia noted but  the stent is patent, gives off the small diagonal branch. Rest of the  LAD is a small vessel. It reaches and wraps the apex and is patent. EDP was measured and EDP is around 20 mmHg. On the pullback there was  no gradient present across the aortic valve. IMPRESSION:  1.   Left main is long and is patent. 2.  Circ is a small-sized small vessel and is patent. 3.  Ramus is a small vessel and is patent. 4. LAD has a mid-stent present, widely patent. Mid to distal LAD,  there is a small vessel less than 2 mm vessel. It reaches and wraps the  apex. 5.  Diagonal is a small vessel which is patent. 6.  Right coronary artery mid-stent is patent and distal to the stent  there is a 99% stenosis noted. EDP was around 20 mmHg. PLAN:  The plan is to proceed with intervention of the right coronary  artery. The patient already had a 6-Panamanian sheath. The patient is  anticoagulated with heparin. ACT was kept greater than 250. The  patient received Plavix 600 mg post procedure and aspirin 325 mg post  procedure. Then using a JR-4 guide, right coronary artery was engaged. Then using  a [employer] Elite wire then it was changed to a Runthrough wire. The lesion  was predilated with 2.5 x 20 mm balloon, then followed by 3.5 x 50 mm  balloon. The lesion was stented with drug-eluting stent Xience a 4.0 x  33 mm stent. Stent was deployed at high pressure to almost 4.5 mm. A  GuideLiner was used as a support. Final angiogram shows that NANCY-3  flow noted. No dissection, perforation, distal embolization noted. The  lesion decreased from 90+ percent to 0%. IMPRESSION:  Successful angioplasty of the distal right coronary artery  and the lesion was stented with drug-eluting stent Xience, 4.0 x 23 mm  stent. Stent was deployed at high pressure. The patient tolerated the  procedure well. No complication noted. The patient received Plavix and  aspirin postprocedure. The patient will be admitted to the hospital  overnight. IMPRESSION:  Successful angioplasty of the distal right coronary artery. Lesion decreased from a 90% stenosis to 0%.         Blood loss 20cc  Cardiac rehab consulted       Etta Samson MD    D: 08/25/2021 10:11:14       T: 08/25/2021 10:14:04     [email or web address]  Job#: [id number]     Doc#: [id number]    CC:

## 2021-08-25 NOTE — PROGRESS NOTES
Patient returned to Cath Holding with radial compression device to right wrist. No bleeding nor hematoma noted. Vitals stable. Call light within reach.

## 2021-08-26 NOTE — DISCHARGE SUMMARY
[address] [address] [facility], [address] [facility]                               DISCHARGE SUMMARY    PATIENT NAME: Evelyn Bishop                      :        1970  MED REC NO:   [id number]                          ROOM:  ACCOUNT NO:   [de-identified]                           ADMIT DATE: 2021  PROVIDER:     Maribell Gillespie MD                  DISCHARGE DATE:  2021    HOSPITAL COURSE:  This is a 49-year-old male patient who came for heart  cath today, underwent angioplasty. Underwent angioplasty of the right  coronary artery. The patient is doing well. The plan was to keep him  in the hospital, but he is doing well. Therefore, we will go ahead and  discharge him home. He is clinically stable. The patient will follow  up in office tomorrow for arm check. Please see the medication  reconciliation list for medications. ACTIVITY:  Per post cath. DISCHARGE CONDITION:  The patient is stable. FINAL DIAGNOSES:  1. Coronary artery disease. 2.  Hypertension. 3.  Hyperlipidemia.         Yoli Quesada MD    D: 2021 14:13:56       T: 2021 14:17:39     [email or web address]  Job#: [id number]     Doc#: [id number]    CC:

## 2021-10-19 ENCOUNTER — APPOINTMENT (OUTPATIENT)
Dept: GENERAL RADIOLOGY | Age: 51
End: 2021-10-19

## 2021-10-19 ENCOUNTER — HOSPITAL ENCOUNTER (EMERGENCY)
Age: 51
Discharge: HOME | End: 2021-10-20
Attending: STUDENT IN AN ORGANIZED HEALTH CARE EDUCATION/TRAINING PROGRAM

## 2021-10-19 VITALS
DIASTOLIC BLOOD PRESSURE: 70 MMHG | SYSTOLIC BLOOD PRESSURE: 117 MMHG | RESPIRATION RATE: 16 BRPM | HEART RATE: 61 BPM | OXYGEN SATURATION: 96 % | TEMPERATURE: 98.1 F

## 2021-10-19 DIAGNOSIS — R07.9 CHEST PAIN, UNSPECIFIED TYPE: Primary | ICD-10-CM

## 2021-10-19 LAB
ANION GAP SERPL CALCULATED.3IONS-SCNC: 10 MMOL/L (ref 4–16)
BASOPHILS ABSOLUTE: 0.1 K/CU MM
BASOPHILS RELATIVE PERCENT: 0.7 % (ref 0–1)
BUN BLDV-MCNC: 15 MG/DL (ref 6–23)
CALCIUM SERPL-MCNC: 8.6 MG/DL (ref 8.3–10.6)
CHLORIDE BLD-SCNC: 104 MMOL/L (ref 99–110)
CO2: 20 MMOL/L (ref 21–32)
CREAT SERPL-MCNC: 1 MG/DL (ref 0.9–1.3)
DIFFERENTIAL TYPE: ABNORMAL
EOSINOPHILS ABSOLUTE: 0.1 K/CU MM
EOSINOPHILS RELATIVE PERCENT: 1.7 % (ref 0–3)
GFR AFRICAN AMERICAN: >60 ML/MIN/1.73M2
GFR NON-AFRICAN AMERICAN: >60 ML/MIN/1.73M2
GLUCOSE BLD-MCNC: 114 MG/DL (ref 70–99)
HCT VFR BLD CALC: 49.9 % (ref 42–52)
HEMOGLOBIN: 16.1 GM/DL (ref 13.5–18)
IMMATURE NEUTROPHIL %: 0.5 % (ref 0–0.43)
LYMPHOCYTES ABSOLUTE: 2.1 K/CU MM
LYMPHOCYTES RELATIVE PERCENT: 27.4 % (ref 24–44)
MCH RBC QN AUTO: 30.1 PG (ref 27–31)
MCHC RBC AUTO-ENTMCNC: 32.3 % (ref 32–36)
MCV RBC AUTO: 93.4 FL (ref 78–100)
MONOCYTES ABSOLUTE: 0.6 K/CU MM
MONOCYTES RELATIVE PERCENT: 7.3 % (ref 0–4)
NUCLEATED RBC %: 0 %
PDW BLD-RTO: 13.2 % (ref 11.7–14.9)
PLATELET # BLD: 202 K/CU MM (ref 140–440)
PMV BLD AUTO: 8.9 FL (ref 7.5–11.1)
POTASSIUM SERPL-SCNC: 4 MMOL/L (ref 3.5–5.1)
RBC # BLD: 5.34 M/CU MM (ref 4.6–6.2)
SEGMENTED NEUTROPHILS ABSOLUTE COUNT: 4.8 K/CU MM
SEGMENTED NEUTROPHILS RELATIVE PERCENT: 62.4 % (ref 36–66)
SODIUM BLD-SCNC: 134 MMOL/L (ref 135–145)
TOTAL IMMATURE NEUTOROPHIL: 0.04 K/CU MM
TOTAL NUCLEATED RBC: 0 K/CU MM
TROPONIN T: <0.01 NG/ML
TROPONIN T: <0.01 NG/ML
WBC # BLD: 7.7 K/CU MM (ref 4–10.5)

## 2021-10-19 PROCEDURE — 93005 ELECTROCARDIOGRAM TRACING: CPT | Performed by: STUDENT IN AN ORGANIZED HEALTH CARE EDUCATION/TRAINING PROGRAM

## 2021-10-19 PROCEDURE — 71045 X-RAY EXAM CHEST 1 VIEW: CPT

## 2021-10-19 PROCEDURE — 85025 COMPLETE CBC W/AUTO DIFF WBC: CPT

## 2021-10-19 PROCEDURE — 80048 BASIC METABOLIC PNL TOTAL CA: CPT

## 2021-10-19 PROCEDURE — 99283 EMERGENCY DEPT VISIT LOW MDM: CPT

## 2021-10-19 PROCEDURE — 84484 ASSAY OF TROPONIN QUANT: CPT

## 2021-10-19 ASSESSMENT — PAIN SCALES - GENERAL: PAINLEVEL_OUTOF10: 1

## 2021-10-20 LAB
EKG ATRIAL RATE: 68 BPM
EKG DIAGNOSIS: NORMAL
EKG P AXIS: 41 DEGREES
EKG P-R INTERVAL: 158 MS
EKG Q-T INTERVAL: 408 MS
EKG QRS DURATION: 92 MS
EKG QTC CALCULATION (BAZETT): 433 MS
EKG R AXIS: -30 DEGREES
EKG T AXIS: 29 DEGREES
EKG VENTRICULAR RATE: 68 BPM

## 2021-10-20 PROCEDURE — 93010 ELECTROCARDIOGRAM REPORT: CPT | Performed by: INTERNAL MEDICINE

## 2021-10-20 NOTE — ED PROVIDER NOTES
Emergency Department Encounter    Patient: Sasha Meeks  MRN: [id number]  : 1970  Date of Evaluation: 10/19/2021  ED Provider:  Izabel Herr MD    Triage Chief Complaint:   Chest Pain (took 1 nitro and 325 ASA at home- nitro helped then pain came back )    Alutiiq:  Sasha Meeks is a 44 y.o. male presenting with chest pain. Patient has a significant cardiac history including 3 MIs in the past and 9 stents. Patient states just prior to presentation had about 20 to 30-minute episode of substernal chest pain with radiation to bilateral shoulders that was initially improved by nitro but then quickly returned. Patient states he had associated diaphoresis, no nausea. States the pain has improved now now he feels at baseline. Denies current chest pain or shortness of breath. Denies fevers, chills, cough, sputum production, lightheadedness or dizziness. Denies lower extremity edema, orthopnea. Denies recent falls or trauma. Denies any other symptom clear abdominal pain, change in urination, change in bowel habits. Denies motor or sensory changes. Denies headache, blurred vision, focal neuro deficits, motor or sensory changes. ROS - see HPI, below listed is current ROS at time of my eval:  At least 10 point review of system reviewed, negative other HPI.     Past Medical History:   Diagnosis Date    CAD (coronary artery disease)     Chronic kidney disease     stones 20 yrs ago    [facility]     Hyperlipidemia     Myocardial infarction ([facility] [address] [facility] , 1996, 2009    MI x3     Past Surgical History:   Procedure Laterality Date    APPENDECTOMY      CARDIAC SURGERY      heart caths    CHOLECYSTECTOMY      CORONARY ANGIOPLASTY WITH STENT PLACEMENT      CYST INCISION AND DRAINAGE       Family History   Problem Relation Age of Onset    Heart Disease Mother     Arthritis Mother     High Blood Pressure Mother     Heart Disease Father     Diabetes Brother 0    nitroGLYCERIN (NITROSTAT) 0.4 MG SL tablet up to max of 3 total doses. If no relief after 1 dose, call 911. 25 tablet 3    aspirin 81 MG chewable tablet Take 1 tablet by mouth daily 30 tablet 3    atorvastatin (LIPITOR) 40 MG tablet Take 1 tablet by mouth nightly 30 tablet 3    clopidogrel (PLAVIX) 75 MG tablet Take 1 tablet by mouth daily 30 tablet 3    nicotine (NICODERM CQ) 14 MG/24HR Place 1 patch onto the skin daily 30 patch 3    losartan (COZAAR) 100 MG tablet Take 1 tablet by mouth daily 30 tablet 3    metoprolol succinate (TOPROL XL) 50 MG extended release tablet Take 1 tablet by mouth daily 30 tablet 3     No Known Allergies    Nursing Notes Reviewed    Physical Exam:  Triage VS:    ED Triage Vitals   Enc Vitals Group      BP       Pulse       Resp       Temp       Temp src       SpO2       Weight       Height       Head Circumference       Peak Flow       Pain Score       Pain Loc       Pain Edu? Excl. in [address]? My pulse ox interpretation is - normal    General appearance:  No acute distress. Skin:  Warm. Dry. Eye:  Extraocular movements intact. Ears, nose, mouth and throat:  Oral mucosa moist   Neck:  Trachea midline. Extremity:  No swelling. Normal ROM     Heart:  Regular rate and rhythm, normal S1 & S2, no extra heart sounds. Perfusion:  intact  Respiratory:  Lungs clear to auscultation bilaterally. Respirations nonlabored. Abdominal:  Normal bowel sounds. Soft. Nontender. Non distended. Back:  No CVA tenderness to palpation     Neurological:  Alert and oriented times 3. No focal neuro deficits. Psychiatric:  Appropriate    I have reviewed and interpreted all of the currently available lab results from this visit (if applicable):  No results found for this visit on 10/19/21. Radiographs (if obtained):  Radiologist's Report Reviewed:  No results found.     EKG (if obtained): (All EKG's are interpreted by myself in the absence of a cardiologist)  Normal sinus rhythm, left axis deviation, ventricular rate 68, AL interval 158, QRS duration 92, QTc 433, no significant ischemic changes, similar to prior exam    MDM:    53-year-old male presenting with chest pain. Patient has a significant cardiac history. Vitals on presentation reassuring and patient afebrile satting on room air. Physical exam lungs are clear to auscultation, cardiac exam is reassuring, abdomen soft and nontender neuro exam is nonfocal.  EKG shows normal sinus rhythm without ischemic changes. Patient has no chest pain or shortness of breath currently. CBC, BMP, Trope, EKG, chest x-ray obtained. CBC and CMP are reassuring. EKG shows normal sinus rhythm with ischemic changes. Initial Trope was within normal limits. Patient's history not consistent with PE or aortic dissection and does not believe further evaluation is necessary for this at this time. Patient denies any current chest pain or shortness of breath. I discussed patient's moderate heart score and the risk that is associated with this. I suggested admission but patient does not want to stay in the hospital.  I called cardiology and they state they can get patient in in the morning for further evaluation. Patient is agreeable to this plan. I discussed strict return precautions with patient and patient discharged home. Clinical Impression:  1. Chest pain, unspecified type          Comment: Please note this report has been produced using speech recognition software and may contain errors related to that system including errors in grammar, punctuation, and spelling, as well as words and phrases that may be inappropriate. Efforts were made to edit the dictations.         Jeanna Millard MD  10/27/21 [id number]

## 2021-10-20 NOTE — ED PROVIDER NOTES
The Ekg interpreted by me shows  normal sinus rhythm with a rate of 68  Axis is   Left axis deviation  QTc is  normal  Intervals and Durations are unremarkable.       ST Segments: no acute change  No significant change from prior EKG dated 1-           Michelle Dugan MD  10/19/21 2008

## 2021-10-20 NOTE — ED TRIAGE NOTES
Pt has large history of cardiac issue, 9 stents, MI. Pt took 1 nitro and 324 ASA at home. Pt reports he was sitting getting ready to eat dinner when pain started. Pt reports extreme pressure across chest and down BUE.

## 2022-09-01 NOTE — CONSULTS
Darrell Grimes(Attending) Dictated-[id number]  Pt. With Hx of CAD s/p PCI in the remote past, hx of MIx3 last in 2009  Last [facility] 2013- patent vessels  Last stress and echo 5/20-Old MI noted, no active ischemia, no LV thrombus noted on Echo  Started to develop mild exertional CP 2 weeks ago got worse yesterday-7/10 pain with Lt.  Arm radiation  D/t continued pain plan for [facility] today  NPO, IVF  Consent signed and placed in soft chart  Thanks for consult    Patient seen and chart reviewed  Cath today  Hx of CAD and PCI in past